# Patient Record
Sex: FEMALE | Race: WHITE | Employment: UNEMPLOYED | URBAN - NONMETROPOLITAN AREA
[De-identification: names, ages, dates, MRNs, and addresses within clinical notes are randomized per-mention and may not be internally consistent; named-entity substitution may affect disease eponyms.]

---

## 2017-02-02 ENCOUNTER — OFFICE VISIT (OUTPATIENT)
Dept: FAMILY MEDICINE | Facility: OTHER | Age: 37
End: 2017-02-02
Payer: COMMERCIAL

## 2017-02-02 VITALS
TEMPERATURE: 95.2 F | SYSTOLIC BLOOD PRESSURE: 112 MMHG | HEART RATE: 64 BPM | RESPIRATION RATE: 16 BRPM | DIASTOLIC BLOOD PRESSURE: 60 MMHG

## 2017-02-02 DIAGNOSIS — F41.9 ANXIETY: ICD-10-CM

## 2017-02-02 DIAGNOSIS — B00.9 HERPES SIMPLEX VIRUS INFECTION: Primary | ICD-10-CM

## 2017-02-02 DIAGNOSIS — L70.8 OTHER ACNE: ICD-10-CM

## 2017-02-02 PROCEDURE — 99213 OFFICE O/P EST LOW 20 MIN: CPT | Performed by: PHYSICIAN ASSISTANT

## 2017-02-02 RX ORDER — TRETINOIN 1 MG/G
CREAM TOPICAL AT BEDTIME
COMMUNITY
End: 2017-11-21

## 2017-02-02 RX ORDER — METRONIDAZOLE 500 MG/1
TABLET ORAL
COMMUNITY
End: 2018-11-06

## 2017-02-02 RX ORDER — TRETINOIN 1 MG/G
CREAM TOPICAL AT BEDTIME
Qty: 45 G | Refills: 1 | Status: SHIPPED | OUTPATIENT
Start: 2017-02-02 | End: 2018-08-22

## 2017-02-02 RX ORDER — VALACYCLOVIR HYDROCHLORIDE 1 G/1
1000 TABLET, FILM COATED ORAL DAILY
Qty: 90 TABLET | Refills: 0 | Status: SHIPPED | OUTPATIENT
Start: 2017-02-02 | End: 2017-09-19

## 2017-02-02 RX ORDER — VALACYCLOVIR HYDROCHLORIDE 1 G/1
1000 TABLET, FILM COATED ORAL 3 TIMES DAILY
COMMUNITY
End: 2017-11-21

## 2017-02-02 ASSESSMENT — PAIN SCALES - GENERAL: PAINLEVEL: NO PAIN (0)

## 2017-02-02 NOTE — PROGRESS NOTES
SUBJECTIVE:                                                    Anastacia Paez is a 36 year old female who presents to clinic today for the following health issues:      Medication Followup of valtrex, retin A, metronidazole     Taking Medication as prescribed: NO-she really self diagnoses and treats PRN for the concerns presented.      Side Effects:  None    Medication Helping Symptoms:  yes     Problem list and histories reviewed & adjusted, as indicated.  Additional history: as documented    Patient Active Problem List   Diagnosis     Seasonal allergic rhinitis     Encounter for initial prescription of intrauterine contraceptive device     Panic attack     Bilateral low back pain without sciatica     Dysmenorrhea     Anxiety     Herpes simplex virus infection     History reviewed. No pertinent past surgical history.    Social History   Substance Use Topics     Smoking status: Former Smoker     Quit date: 01/01/2004     Smokeless tobacco: Never Used     Alcohol Use: Yes      Comment: occasionally     History reviewed. No pertinent family history.        ROS:  Constitutional, HEENT, cardiovascular, pulmonary, gi and gu systems are negative, except as otherwise noted.    OBJECTIVE:                                                    /60 mmHg  Pulse 64  Temp(Src) 95.2  F (35.1  C) (Oral)  Resp 16  Ht   Wt   There is no weight on file to calculate BMI.  GENERAL: healthy, alert and no distress  NECK: no adenopathy, no asymmetry, masses, or scars and trachea midline and normal to palpation  RESP: lungs clear to auscultation - no rales, rhonchi or wheezes  CV: regular rate and rhythm, normal S1 S2, no S3 or S4, no murmur, click or rub, no peripheral edema and peripheral pulses strong  ABDOMEN: soft, nontender, no hepatosplenomegaly, no masses and bowel sounds normal  MS: no gross musculoskeletal defects noted, no edema  PSYCH: mentation appears normal, affect normal/bright    Diagnostic Test Results:  No results  found for this or any previous visit (from the past 24 hour(s)).     ASSESSMENT/PLAN:                                                    Anastacia was seen today for recheck medication.    Diagnoses and all orders for this visit:    Herpes simplex virus infection  Comments:  perioral  Orders:  -     valACYclovir (VALTREX) 1000 mg tablet; Take 1 tablet (1,000 mg) by mouth daily    Anxiety    Other acne  -     tretinoin (RETIN-A) 0.1 % cream; Apply topically At Bedtime    Advised she needs to get records prior to any further refills.  Regulo Dudley PA-C  [facility]

## 2017-02-02 NOTE — MR AVS SNAPSHOT
After Visit Summary   2/2/2017    Anastacia Paez    MRN: [id number]           Patient Information     Date Of Birth          1980        Visit Information        Provider Department      2/2/2017 8:40 AM Regulo Bearden PA-C [facility]        Today's Diagnoses     Herpes simplex virus infection    -  1     Anxiety         Other acne            Follow-ups after your visit        Who to contact     If you have questions or need follow up information about today's clinic visit or your schedule please contact [facility] directly at [phone].  Normal or non-critical lab and imaging results will be communicated to you by webmehart, letter or phone within 4 business days after the clinic has received the results. If you do not hear from us within 7 days, please contact the clinic through eEventt or phone. If you have a critical or abnormal lab result, we will notify you by phone as soon as possible.  Submit refill requests through [employer] or call your pharmacy and they will forward the refill request to us. Please allow 3 business days for your refill to be completed.          Additional Information About Your Visit        MyChart Information     [employer] gives you secure access to your electronic health record. If you see a primary care provider, you can also send messages to your care team and make appointments. If you have questions, please call your primary care clinic.  If you do not have a primary care provider, please call [phone] and they will assist you.        Care EveryWhere ID     This is your Care EveryWhere ID. This could be used by other organizations to access your [place] medical records  [id number]        Your Vitals Were     Pulse Temperature Respirations             64 95.2  F (35.1  C) (Oral) 16          Blood Pressure from Last 3 Encounters:   02/02/17 112/60   07/26/16 94/64   04/22/16 90/56    Weight from Last 3 Encounters:   07/26/16 156 lb (70.761  kg)   04/22/16 147 lb (66.679 kg)   11/17/15 148 lb (67.132 kg)              Today, you had the following     No orders found for display         Today's Medication Changes          These changes are accurate as of: 2/2/17  8:49 AM.  If you have any questions, ask your nurse or doctor.               These medicines have changed or have updated prescriptions.        Dose/Directions    * tretinoin 0.1 % cream   Commonly known as:  RETIN-A   This may have changed:  Another medication with the same name was added. Make sure you understand how and when to take each.   Changed by:  Regulo Bearden PA-C        Apply topically At Bedtime   Refills:  0       * tretinoin 0.1 % cream   Commonly known as:  RETIN-A   This may have changed:  You were already taking a medication with the same name, and this prescription was added. Make sure you understand how and when to take each.   Used for:  Other acne   Changed by:  Regulo Bearden PA-C        Apply topically At Bedtime   Quantity:  45 g   Refills:  1       * valACYclovir 1000 mg tablet   Commonly known as:  VALTREX   This may have changed:  Another medication with the same name was added. Make sure you understand how and when to take each.   Changed by:  Regulo Bearden PA-C        Dose:  1000 mg   Take 1,000 mg by mouth 3 times daily   Refills:  0       * valACYclovir 1000 mg tablet   Commonly known as:  VALTREX   This may have changed:  You were already taking a medication with the same name, and this prescription was added. Make sure you understand how and when to take each.   Used for:  Herpes simplex virus infection   Changed by:  Regulo Bearden PA-C        Dose:  1000 mg   Take 1 tablet (1,000 mg) by mouth daily   Quantity:  90 tablet   Refills:  0       * Notice:  This list has 4 medication(s) that are the same as other medications prescribed for you. Read the directions carefully, and ask your doctor or other care provider to review them with you.         Where to get  your medicines      These medications were sent to [facility] [place] - [place], [place] - [address]  [address] , [place] [address]     Phone:  [phone]    - tretinoin 0.1 % cream  - valACYclovir 1000 mg tablet             Primary Care Provider Office Phone # Fax #    Regulo Bearden PA-C [phone]       [facility] [address] [facility] [address]        Thank you!     Thank you for choosing [facility]  for your care. Our goal is always to provide you with excellent care. Hearing back from our patients is one way we can continue to improve our services. Please take a few minutes to complete the written survey that you may receive in the mail after your visit with us. Thank you!             Your Updated Medication List - Protect others around you: Learn how to safely use, store and throw away your medicines at www.disposemymeds.org.          This list is accurate as of: 2/2/17  8:49 AM.  Always use your most recent med list.                   Brand Name Dispense Instructions for use    ALPRAZolam 0.5 MG tablet    XANAX    90 tablet    Take 1 tablet (0.5 mg) by mouth 3 times daily as needed for anxiety       cetirizine 10 MG tablet    zyrTEC     Take 10 mg by mouth daily       citalopram 10 MG tablet    celeXA    90 tablet    Take 1 tablet (10 mg) by mouth daily       lidocaine 2 % topical gel    XYLOCAINE     Apply topically as needed for moderate pain Three time daily as needed       metroNIDAZOLE 500 MG tablet    FLAGYL     As needed for bacterial vaginal infections.       paragard intrauterine copper      1 each by Intrauterine route once Due to be changes in December 2018       * tretinoin 0.1 % cream    RETIN-A     Apply topically At Bedtime       * tretinoin 0.1 % cream    RETIN-A    45 g    Apply topically At Bedtime       * valACYclovir 1000 mg tablet    VALTREX     Take 1,000 mg by mouth 3 times daily       * valACYclovir 1000 mg tablet     VALTREX    90 tablet    Take 1 tablet (1,000 mg) by mouth daily       VOLTAREN 1 % Gel topical gel   Generic drug:  diclofenac      As needed for back pain       * Notice:  This list has 4 medication(s) that are the same as other medications prescribed for you. Read the directions carefully, and ask your doctor or other care provider to review them with you.

## 2017-04-05 DIAGNOSIS — F41.9 ANXIETY: ICD-10-CM

## 2017-04-05 NOTE — TELEPHONE ENCOUNTER
Alprazolam      Last Written Prescription Date: 10/26/16  Last Fill Quantity: 90,  # refills: 1   Last Office Visit with [facility], [facility] or [facility] prescribing provider: 2/2/17

## 2017-04-06 RX ORDER — ALPRAZOLAM 0.5 MG
0.5 TABLET ORAL 3 TIMES DAILY PRN
Qty: 90 TABLET | Refills: 1 | Status: SHIPPED | OUTPATIENT
Start: 2017-04-06 | End: 2017-09-19

## 2017-05-05 ENCOUNTER — TELEPHONE (OUTPATIENT)
Dept: OBGYN | Facility: OTHER | Age: 37
End: 2017-05-05

## 2017-05-05 NOTE — TELEPHONE ENCOUNTER
Reason for Call:  Other appointment    Detailed comments: pt scheduled for an appt June 2nd, but would like to be seen sooner if possible. Please contact pt if available to be seen sooner.     Phone Number Patient can be reached at: Home number on file [phone] (home)    Best Time: ANY    Can we leave a detailed message on this number? YES    Call taken on 5/5/2017 at 3:57 PM by Melisa Summers

## 2017-05-28 DIAGNOSIS — F41.9 ANXIETY: ICD-10-CM

## 2017-05-28 NOTE — TELEPHONE ENCOUNTER
Celexa 10mg      Last Written Prescription Date: 11/22/16  Last Fill Quantity: 90,  # refills: 1   Last Office Visit with [facility], [facility] or [facility] prescribing provider: 07/26/2016                                         Next 5 appointments (look out 90 days)     May 30, 2017 11:00 AM CDT   Office Visit with Elke Richardson DO   [facility] ([facility])    [address] [place] [address] [phone] [phone]                   Terri Carvajal, Pharmacy Technician  [facility]  [phone]

## 2017-05-30 RX ORDER — CITALOPRAM HYDROBROMIDE 10 MG/1
10 TABLET ORAL DAILY
Qty: 90 TABLET | Refills: 0 | Status: SHIPPED | OUTPATIENT
Start: 2017-05-30 | End: 2017-08-16

## 2017-08-16 DIAGNOSIS — F41.9 ANXIETY: ICD-10-CM

## 2017-08-16 RX ORDER — CITALOPRAM HYDROBROMIDE 10 MG/1
10 TABLET ORAL DAILY
Qty: 90 TABLET | Refills: 0 | Status: SHIPPED | OUTPATIENT
Start: 2017-08-16 | End: 2017-11-16

## 2017-08-16 NOTE — TELEPHONE ENCOUNTER
Celexa     Last Written Prescription Date: 05/30/2017  Last Fill Quantity: 90, # refills: 0  Last Office Visit with [facility] care provider:  02/02/2017        Last PHQ-9 score on record=   PHQ-9 SCORE 4/12/2016   Total Score 1           Thanks  Gissel Ewing [facility]   [phone]

## 2017-09-19 DIAGNOSIS — B00.9 HERPES SIMPLEX VIRUS INFECTION: ICD-10-CM

## 2017-09-19 DIAGNOSIS — F41.9 ANXIETY: ICD-10-CM

## 2017-09-19 RX ORDER — ALPRAZOLAM 0.5 MG
0.5 TABLET ORAL 3 TIMES DAILY PRN
Qty: 90 TABLET | Refills: 0 | Status: SHIPPED | OUTPATIENT
Start: 2017-09-19 | End: 2017-11-16

## 2017-09-19 RX ORDER — VALACYCLOVIR HYDROCHLORIDE 1 G/1
1000 TABLET, FILM COATED ORAL DAILY
Qty: 90 TABLET | Refills: 1 | Status: SHIPPED | OUTPATIENT
Start: 2017-09-19 | End: 2018-09-30

## 2017-09-19 NOTE — TELEPHONE ENCOUNTER
Valtrex      Last Written Prescription Date: 2/2/17  Last Fill Quantity: 90,  # refills: 0   Last Office Visit with [facility], [facility] or [facility] prescribing provider: 2/2/17                                             Alprazolam      Last Written Prescription Date: 4/6/17  Last Fill Quantity: 90,  # refills: 1   Last Office Visit with [facility], [facility] or [facility] prescribing provider: 2/2/17

## 2017-10-16 ENCOUNTER — OFFICE VISIT (OUTPATIENT)
Dept: FAMILY MEDICINE | Facility: CLINIC | Age: 37
End: 2017-10-16
Payer: COMMERCIAL

## 2017-10-16 VITALS
DIASTOLIC BLOOD PRESSURE: 58 MMHG | OXYGEN SATURATION: 99 % | BODY MASS INDEX: 25.39 KG/M2 | HEIGHT: 65 IN | TEMPERATURE: 98.7 F | HEART RATE: 60 BPM | SYSTOLIC BLOOD PRESSURE: 112 MMHG | WEIGHT: 152.4 LBS

## 2017-10-16 DIAGNOSIS — R21 RASH: Primary | ICD-10-CM

## 2017-10-16 PROCEDURE — 99213 OFFICE O/P EST LOW 20 MIN: CPT | Performed by: NURSE PRACTITIONER

## 2017-10-16 NOTE — PATIENT INSTRUCTIONS
Your provider has referred you to: [facility]: [facility] [phone]    Follow-up with your primary care provider next week and as needed.    Indications for emergent return to emergency department discussed with patient, who verbalized good understanding and agreement.  Patient understands the limitations of today's evaluation.         Self-Care for Skin Rashes     Pat your skin dry. Do not rub.     When your skin reacts to a substance your body is sensitive to, it can cause a rash. You can treat most rashes at home by keeping the skin clean and dry. Many rashes may get better on their own within 2 to 3 days. You may need medical attention if your rash itches, drains, or hurts, particularly if the rash is getting worse.  What can cause a skin rash?    Sun poisoning, caused by too much exposure to the sun    An irritant or allergic reaction to a certain type of food, plant, or chemical, such as  shellfish, poison ivy, and or cleaning products    An infection caused by a fungus (ringworm), virus (chickenpox), or bacteria (strep)    Bites or infestation caused by insects or pests, such as ticks, lice, or mites    Dry skin, which is often seen during the winter months and in older people  How can I control itching and skin damage?    Take soothing lukewarm baths in a colloidal oatmeal product. You can buy this at the [employer].    Do your best not to scratch. Clip fingernails short, especially in young children, to reduce skin damage if scratching does occur.    Use moisturizing skin lotion instead of scratching your dry skin.    Use sunscreen whenever going out into direct sun.    Use only mild cleansing agents whenever possible.    Wash with mild, nonirritating soap and warm water.    Wear clothing that breathes, such as cotton shirts or canvas shoes.    If fluid is seeping from the rash, cover it loosely with clean gauze to absorb the discharge.    Many rashes are contagious. Prevent the  rash from spreading to others by washing your hands often before or after touching others with any skin rash.  Use medicine    Antihistamines such as diphenhydramine can help control itching. But use with caution because they can make you drowsy.    Using over-the-counter hydrocortisone cream on small rashes may help reduce swelling and itching    Most over-the-counter antifungal medicines can treat athlete s foot and many other fungal infections of the skin.  Check with your healthcare provider  Call your healthcare provider if:    You were told that you have a fungal infection on your skin to make sure you have the correct type of medicine.    You have questions or concerns about medicines or their side effects.     Call 911  Call 911 if either of these occur:    Your tongue or lips start to swell    You have difficulty breathing      Call your healthcare provider  Call your healthcare provider if any of these occur:    Temperature of more than 101.0 F (38.3 C), or as directed    Sore throat, a cough, or unusual fatigue    Red, oozy, or painful rash gets worse. These are signs of infection.    Rash covers your face, genitals, or most of your body    Crusty sores or red rings that begin to spread    You were exposed to someone who has a contagious rash, such as scabies or lice.    Red bull s-eye rash with a white center (a sign of Lyme disease)    You were told that you have resistant bacteria (MRSA) on your skin.   Date Last Reviewed: 5/12/2015 2000-2017 [employer]. [address] [place] [address], [place] [address]. All rights reserved. This information is not intended as a substitute for professional medical care. Always follow your healthcare professional's instructions.

## 2017-10-16 NOTE — PROGRESS NOTES
SUBJECTIVE:   Anastacia Paez is a 37 year old female who presents to clinic today for the following health issues:      Derm problem       Duration: 6 months     Description (location/character/radiation): skin discoloration     Intensity:  moderate    Accompanying signs and symptoms: itching, tingles, burns, increased dark hair growth, no mole in the area, pinkish/brownish color     Requesting skin biopsy     History (similar episodes/previous evaluation): chronic sun lucas     Precipitating or alleviating factors: None    Therapies tried and outcome: wound wash- didn't help          Problem list and histories reviewed & adjusted, as indicated.  Additional history: as documented    Patient Active Problem List   Diagnosis     Seasonal allergic rhinitis     Encounter for initial prescription of intrauterine contraceptive device     Panic attack     Bilateral low back pain without sciatica     Dysmenorrhea     Anxiety     Herpes simplex virus infection     History reviewed. No pertinent surgical history.    Social History   Substance Use Topics     Smoking status: Former Smoker     Quit date: 1/1/2004     Smokeless tobacco: Never Used     Alcohol use Yes      Comment: occasionally     History reviewed. No pertinent family history.      Current Outpatient Prescriptions   Medication Sig Dispense Refill     ALPRAZolam (XANAX) 0.5 MG tablet Take 1 tablet (0.5 mg) by mouth 3 times daily as needed for anxiety 90 tablet 0     valACYclovir (VALTREX) 1000 mg tablet Take 1 tablet (1,000 mg) by mouth daily 90 tablet 1     citalopram (CELEXA) 10 MG tablet Take 1 tablet (10 mg) by mouth daily 90 tablet 0     tretinoin (RETIN-A) 0.1 % cream Apply topically At Bedtime       valACYclovir (VALTREX) 1000 mg tablet Take 1,000 mg by mouth 3 times daily       tretinoin (RETIN-A) 0.1 % cream Apply topically At Bedtime 45 g 1     diclofenac (VOLTAREN) 1 % GEL As needed for back pain       lidocaine (XYLOCAINE) 2 % jelly Apply topically as  "needed for moderate pain Three time daily as needed       paragard intrauterine copper 1 each by Intrauterine route once Due to be changes in December 2018       cetirizine (ZYRTEC) 10 MG tablet Take 10 mg by mouth daily       metroNIDAZOLE (FLAGYL) 500 MG tablet As needed for bacterial vaginal infections.       Allergies   Allergen Reactions     Morphine Itching         Reviewed and updated as needed this visit by clinical staffTobacco  Allergies  Meds  Med Hx  Surg Hx  Fam Hx  Soc Hx      Reviewed and updated as needed this visit by Provider         ROS:  Constitutional, HEENT, cardiovascular, pulmonary, gi and gu systems are negative, except as otherwise noted.      OBJECTIVE:   /58  Pulse 60  Temp 98.7  F (37.1  C) (Tympanic)  Ht 5' 5\" (1.651 m)  Wt 152 lb 6.4 oz (69.1 kg)  SpO2 99%  BMI 25.36 kg/m2  Body mass index is 25.36 kg/(m^2).   GENERAL: healthy, alert and no distress  EYES: Eyes grossly normal to inspection, PERRL and conjunctivae and sclerae normal  HENT: ear canals and TM's normal, nose and mouth without ulcers or lesions  NECK: no adenopathy, no asymmetry, masses, or scars and thyroid normal to palpation  RESP: lungs clear to auscultation - no rales, rhonchi or wheezes  BREAST: normal without masses, tenderness or nipple discharge and no palpable axillary masses or adenopathy  CV: regular rate and rhythm, normal S1 S2, no S3 or S4, no murmur, click or rub, no peripheral edema and peripheral pulses strong  ABDOMEN: soft, nontender, no hepatosplenomegaly, no masses and bowel sounds normal  MS: no gross musculoskeletal defects noted, no edema  SKIN: no suspicious lesions small area  slightly raised, red patches upper left leg.   NEURO: Normal strength and tone, mentation intact and speech normal  PSYCH: mentation appears normal, affect normal/bright        ASSESSMENT:       ICD-10-CM    1. Rash R21 DERMATOLOGY REFERRAL         PLAN:   Reviewed with patient that this is most " representative of eczema.  Patient instant that it needs to be biopsied and request a referral to dermatology.     Patient Instructions     Your provider has referred you to: [facility]: [facility] [phone]    Follow-up with your primary care provider next week and as needed.    Indications for emergent return to emergency department discussed with patient, who verbalized good understanding and agreement.  Patient understands the limitations of today's evaluation.         Self-Care for Skin Rashes     Pat your skin dry. Do not rub.     When your skin reacts to a substance your body is sensitive to, it can cause a rash. You can treat most rashes at home by keeping the skin clean and dry. Many rashes may get better on their own within 2 to 3 days. You may need medical attention if your rash itches, drains, or hurts, particularly if the rash is getting worse.  What can cause a skin rash?    Sun poisoning, caused by too much exposure to the sun    An irritant or allergic reaction to a certain type of food, plant, or chemical, such as  shellfish, poison ivy, and or cleaning products    An infection caused by a fungus (ringworm), virus (chickenpox), or bacteria (strep)    Bites or infestation caused by insects or pests, such as ticks, lice, or mites    Dry skin, which is often seen during the winter months and in older people  How can I control itching and skin damage?    Take soothing lukewarm baths in a colloidal oatmeal product. You can buy this at the [employer].    Do your best not to scratch. Clip fingernails short, especially in young children, to reduce skin damage if scratching does occur.    Use moisturizing skin lotion instead of scratching your dry skin.    Use sunscreen whenever going out into direct sun.    Use only mild cleansing agents whenever possible.    Wash with mild, nonirritating soap and warm water.    Wear clothing that breathes, such as cotton shirts or canvas  shoes.    If fluid is seeping from the rash, cover it loosely with clean gauze to absorb the discharge.    Many rashes are contagious. Prevent the rash from spreading to others by washing your hands often before or after touching others with any skin rash.  Use medicine    Antihistamines such as diphenhydramine can help control itching. But use with caution because they can make you drowsy.    Using over-the-counter hydrocortisone cream on small rashes may help reduce swelling and itching    Most over-the-counter antifungal medicines can treat athlete s foot and many other fungal infections of the skin.  Check with your healthcare provider  Call your healthcare provider if:    You were told that you have a fungal infection on your skin to make sure you have the correct type of medicine.    You have questions or concerns about medicines or their side effects.     Call 911  Call 911 if either of these occur:    Your tongue or lips start to swell    You have difficulty breathing      Call your healthcare provider  Call your healthcare provider if any of these occur:    Temperature of more than 101.0 F (38.3 C), or as directed    Sore throat, a cough, or unusual fatigue    Red, oozy, or painful rash gets worse. These are signs of infection.    Rash covers your face, genitals, or most of your body    Crusty sores or red rings that begin to spread    You were exposed to someone who has a contagious rash, such as scabies or lice.    Red bull s-eye rash with a white center (a sign of Lyme disease)    You were told that you have resistant bacteria (MRSA) on your skin.   Date Last Reviewed: 5/12/2015 2000-2017 [employer]. [address] [place] [address], [place] [address]. All rights reserved. This information is not intended as a substitute for professional medical care. Always follow your healthcare professional's instructions.              JUDI Dawson [facility]

## 2017-10-16 NOTE — MR AVS SNAPSHOT
After Visit Summary   10/16/2017    Anastacia Paez    MRN: [id number]           Patient Information     Date Of Birth          1980        Visit Information        Provider Department      10/16/2017 2:20 PM Beryl Coffey APRN CNP [facility]        Today's Diagnoses     Rash    -  1      Care Instructions    Your provider has referred you to: [facility]: [facility] [phone]    Follow-up with your primary care provider next week and as needed.    Indications for emergent return to emergency department discussed with patient, who verbalized good understanding and agreement.  Patient understands the limitations of today's evaluation.         Self-Care for Skin Rashes     Pat your skin dry. Do not rub.     When your skin reacts to a substance your body is sensitive to, it can cause a rash. You can treat most rashes at home by keeping the skin clean and dry. Many rashes may get better on their own within 2 to 3 days. You may need medical attention if your rash itches, drains, or hurts, particularly if the rash is getting worse.  What can cause a skin rash?    Sun poisoning, caused by too much exposure to the sun    An irritant or allergic reaction to a certain type of food, plant, or chemical, such as  shellfish, poison ivy, and or cleaning products    An infection caused by a fungus (ringworm), virus (chickenpox), or bacteria (strep)    Bites or infestation caused by insects or pests, such as ticks, lice, or mites    Dry skin, which is often seen during the winter months and in older people  How can I control itching and skin damage?    Take soothing lukewarm baths in a colloidal oatmeal product. You can buy this at the [employer].    Do your best not to scratch. Clip fingernails short, especially in young children, to reduce skin damage if scratching does occur.    Use moisturizing skin lotion instead of scratching your dry skin.    Use sunscreen  whenever going out into direct sun.    Use only mild cleansing agents whenever possible.    Wash with mild, nonirritating soap and warm water.    Wear clothing that breathes, such as cotton shirts or canvas shoes.    If fluid is seeping from the rash, cover it loosely with clean gauze to absorb the discharge.    Many rashes are contagious. Prevent the rash from spreading to others by washing your hands often before or after touching others with any skin rash.  Use medicine    Antihistamines such as diphenhydramine can help control itching. But use with caution because they can make you drowsy.    Using over-the-counter hydrocortisone cream on small rashes may help reduce swelling and itching    Most over-the-counter antifungal medicines can treat athlete s foot and many other fungal infections of the skin.  Check with your healthcare provider  Call your healthcare provider if:    You were told that you have a fungal infection on your skin to make sure you have the correct type of medicine.    You have questions or concerns about medicines or their side effects.     Call 911  Call 911 if either of these occur:    Your tongue or lips start to swell    You have difficulty breathing      Call your healthcare provider  Call your healthcare provider if any of these occur:    Temperature of more than 101.0 F (38.3 C), or as directed    Sore throat, a cough, or unusual fatigue    Red, oozy, or painful rash gets worse. These are signs of infection.    Rash covers your face, genitals, or most of your body    Crusty sores or red rings that begin to spread    You were exposed to someone who has a contagious rash, such as scabies or lice.    Red bull s-eye rash with a white center (a sign of Lyme disease)    You were told that you have resistant bacteria (MRSA) on your skin.   Date Last Reviewed: 5/12/2015 2000-2017 [employer]. [address] [place] [address], [place] [address]. All rights reserved. This information is  not intended as a substitute for professional medical care. Always follow your healthcare professional's instructions.                Follow-ups after your visit        Additional Services     DERMATOLOGY REFERRAL       Your provider has referred you to: VAIBHAV: [facility] [phone]   [email or web address]    Please be aware that coverage of these services is subject to the terms and limitations of your health insurance plan.  Call member services at your health plan with any benefit or coverage questions.      Please bring the following with you to your appointment:    (1) Any X-Rays, CTs or MRIs which have been performed.  Contact the facility where they were done to arrange for  prior to your scheduled appointment.    (2) List of current medications  (3) This referral request   (4) Any documents/labs given to you for this referral                  Who to contact     If you have questions or need follow up information about today's clinic visit or your schedule please contact [facility] directly at [phone].  Normal or non-critical lab and imaging results will be communicated to you by [employer], letter or phone within 4 business days after the clinic has received the results. If you do not hear from us within 7 days, please contact the clinic through [employer] or phone. If you have a critical or abnormal lab result, we will notify you by phone as soon as possible.  Submit refill requests through [employer] or call your pharmacy and they will forward the refill request to us. Please allow 3 business days for your refill to be completed.          Additional Information About Your Visit        [employer] Information     [employer] gives you secure access to your electronic health record. If you see a primary care provider, you can also send messages to your care team and make appointments. If you have questions, please call your primary care clinic.   "If you do not have a primary care provider, please call [phone] and they will assist you.        Care EveryWhere ID     This is your Care EveryWhere ID. This could be used by other organizations to access your [facility] medical records  [id number]        Your Vitals Were     Pulse Temperature Height Pulse Oximetry BMI (Body Mass Index)       60 98.7  F (37.1  C) (Tympanic) 5' 5\" (1.651 m) 99% 25.36 kg/m2        Blood Pressure from Last 3 Encounters:   10/16/17 112/58   02/02/17 112/60   07/26/16 94/64    Weight from Last 3 Encounters:   10/16/17 152 lb 6.4 oz (69.1 kg)   07/26/16 156 lb (70.8 kg)   04/22/16 147 lb (66.7 kg)              We Performed the Following     DERMATOLOGY REFERRAL        Primary Care Provider Office Phone # Fax #    Regulo Bearden PA-C [phone]       [address] [place] [address]        Equal Access to Services     [facility]: Hadii karen ku hadasho Soomaali, waaxda luqadaha, qaybta kaalmada adeegyada, ca boswell . So [facility] [phone].    ATENCIÓN: Si habla español, tiene a gilbert disposición servicios gratuitos de asistencia lingüística. Llame al [phone].    We comply with applicable federal civil rights laws and [place] laws. We do not discriminate on the basis of race, color, national origin, age, disability, sex, sexual orientation, or gender identity.            Thank you!     Thank you for choosing [facility]  for your care. Our goal is always to provide you with excellent care. Hearing back from our patients is one way we can continue to improve our services. Please take a few minutes to complete the written survey that you may receive in the mail after your visit with us. Thank you!             Your Updated Medication List - Protect others around you: Learn how to safely use, store and throw away your medicines at www.disposemymeds.org.          This list is accurate as of: 10/16/17  2:34 PM.  Always use " your most recent med list.                   Brand Name Dispense Instructions for use Diagnosis    ALPRAZolam 0.5 MG tablet    XANAX    90 tablet    Take 1 tablet (0.5 mg) by mouth 3 times daily as needed for anxiety    Anxiety       cetirizine 10 MG tablet    zyrTEC     Take 10 mg by mouth daily        citalopram 10 MG tablet    celeXA    90 tablet    Take 1 tablet (10 mg) by mouth daily    Anxiety       lidocaine 2 % topical gel    XYLOCAINE     Apply topically as needed for moderate pain Three time daily as needed        metroNIDAZOLE 500 MG tablet    FLAGYL     As needed for bacterial vaginal infections.        paragard intrauterine copper      1 each by Intrauterine route once Due to be changes in December 2018        * tretinoin 0.1 % cream    RETIN-A     Apply topically At Bedtime        * tretinoin 0.1 % cream    RETIN-A    45 g    Apply topically At Bedtime    Other acne       * valACYclovir 1000 mg tablet    VALTREX     Take 1,000 mg by mouth 3 times daily        * valACYclovir 1000 mg tablet    VALTREX    90 tablet    Take 1 tablet (1,000 mg) by mouth daily    Herpes simplex virus infection       VOLTAREN 1 % Gel topical gel   Generic drug:  diclofenac      As needed for back pain        * Notice:  This list has 4 medication(s) that are the same as other medications prescribed for you. Read the directions carefully, and ask your doctor or other care provider to review them with you.

## 2017-10-16 NOTE — NURSING NOTE
"Chief Complaint   Patient presents with     Skin Tags       Initial /58  Pulse 60  Temp 98.7  F (37.1  C) (Tympanic)  Ht 5' 5\" (1.651 m)  Wt 152 lb 6.4 oz (69.1 kg)  SpO2 99%  BMI 25.36 kg/m2 Estimated body mass index is 25.36 kg/(m^2) as calculated from the following:    Height as of this encounter: 5' 5\" (1.651 m).    Weight as of this encounter: 152 lb 6.4 oz (69.1 kg).  Medication Reconciliation: complete    Health Maintenance that is potentially due pending provider review:  NONE    n/a    Is there anyone who you would like to be able to receive your results? No  If yes have patient fill out EBONY      "

## 2017-10-19 ENCOUNTER — OFFICE VISIT (OUTPATIENT)
Dept: DERMATOLOGY | Facility: CLINIC | Age: 37
End: 2017-10-19
Payer: COMMERCIAL

## 2017-10-19 VITALS
HEART RATE: 57 BPM | SYSTOLIC BLOOD PRESSURE: 109 MMHG | DIASTOLIC BLOOD PRESSURE: 60 MMHG | HEIGHT: 65 IN | WEIGHT: 147 LBS | BODY MASS INDEX: 24.49 KG/M2

## 2017-10-19 DIAGNOSIS — L81.4 LENTIGO: ICD-10-CM

## 2017-10-19 DIAGNOSIS — L28.0 LICHEN SIMPLEX CHRONICUS: ICD-10-CM

## 2017-10-19 DIAGNOSIS — D22.9 NEVUS: ICD-10-CM

## 2017-10-19 DIAGNOSIS — L82.1 SEBORRHEIC KERATOSIS: ICD-10-CM

## 2017-10-19 DIAGNOSIS — Z41.1 ELECTIVE PROCEDURE FOR UNACCEPTABLE COSMETIC APPEARANCE: ICD-10-CM

## 2017-10-19 DIAGNOSIS — D18.00 ANGIOMA: ICD-10-CM

## 2017-10-19 DIAGNOSIS — L57.8 DIFFUSE PHOTODAMAGE OF SKIN: ICD-10-CM

## 2017-10-19 DIAGNOSIS — D48.5 NEOPLASM OF UNCERTAIN BEHAVIOR OF SKIN: Primary | ICD-10-CM

## 2017-10-19 PROCEDURE — 96999 UNLISTED SPEC DERM SVC/PX: CPT | Performed by: PHYSICIAN ASSISTANT

## 2017-10-19 PROCEDURE — 11100 HC BIOPSY SKIN/SUBQ/MUC MEM, SINGLE LESION: CPT | Performed by: PHYSICIAN ASSISTANT

## 2017-10-19 PROCEDURE — 88305 TISSUE EXAM BY PATHOLOGIST: CPT | Performed by: PHYSICIAN ASSISTANT

## 2017-10-19 PROCEDURE — 99204 OFFICE O/P NEW MOD 45 MIN: CPT | Mod: 25 | Performed by: PHYSICIAN ASSISTANT

## 2017-10-19 RX ORDER — BETAMETHASONE DIPROPIONATE 0.5 MG/G
CREAM TOPICAL
Qty: 50 G | Refills: 1 | Status: SHIPPED | OUTPATIENT
Start: 2017-10-19

## 2017-10-19 NOTE — MR AVS SNAPSHOT
After Visit Summary   10/19/2017    Anastacia Paez    MRN: [id number]           Patient Information     Date Of Birth          1980        Visit Information        Provider Department      10/19/2017 11:30 AM aKtelin Okeefe PA-C [facility]        Today's Diagnoses     Neoplasm of uncertain behavior of skin    -  1      Care Instructions          Wound Care Instructions     FOR SUPERFICIAL WOUNDS     [facility] [phone]    [facility] [phone]      Left thigh                 AFTER 24 HOURS YOU SHOULD REMOVE THE BANDAGE AND BEGIN DAILY DRESSING CHANGES AS FOLLOWS:     1) Remove Dressing.     2) Clean and dry the area with tap water using a Q-tip or sterile gauze pad.     3) Apply Vaseline, Aquaphor, Polysporin ointment or Bacitracin ointment over entire wound.  Do NOT use Neosporin ointment.     4) Cover the wound with a band-aid, or a sterile non-stick gauze pad and micropore paper tape      REPEAT THESE INSTRUCTIONS AT LEAST ONCE A DAY UNTIL THE WOUND HAS COMPLETELY HEALED.    It is an old wives tale that a wound heals better when it is exposed to air and allowed to dry out. The wound will heal faster with a better cosmetic result if it is kept moist with ointment and covered with a bandage.    **Do not let the wound dry out.**      Supplies Needed:      *Cotton tipped applicators (Q-tips)    *Polysporin Ointment or Bacitracin Ointment (NOT NEOSPORIN)    *Band-aids or non-stick gauze pads and micropore paper tape.      PATIENT INFORMATION:    During the healing process you will notice a number of changes. All wounds develop a small halo of redness surrounding the wound.  This means healing is occurring. Severe itching with extensive redness usually indicates sensitivity to the ointment or bandage tape used to dress the wound.  You should call our office if this develops.      Swelling  and/or discoloration around your surgical site is common,  particularly when performed around the eye.    All wounds normally drain.  The larger the wound the more drainage there will be.  After 7-10 days, you will notice the wound beginning to shrink and new skin will begin to grow.  The wound is healed when you can see skin has formed over the entire area.  A healed wound has a healthy, shiny look to the surface and is red to dark pink in color to normalize.  Wounds may take approximately 4-6 weeks to heal.  Larger wounds may take 6-8 weeks.  After the wound is healed you may discontinue dressing changes.    You may experience a sensation of tightness as your wound heals. This is normal and will gradually subside.    Your healed wound may be sensitive to temperature changes. This sensitivity improves with time, but if you re having a lot of discomfort, try to avoid temperature extremes.    Patients frequently experience itching after their wound appears to have healed because of the continue healing under the skin.  Plain Vaseline will help relieve the itching.        POSSIBLE COMPLICATIONS    BLEEDIN. Leave the bandage in place.  2. Use tightly rolled up gauze or a cloth to apply direct pressure over the bandage for 30  minutes.  3. Reapply pressure for an additional 30 minutes if necessary  4. Use additional gauze and tape to maintain pressure once the bleeding has stopped.              Follow-ups after your visit        Who to contact     If you have questions or need follow up information about today's clinic visit or your schedule please contact [facility] directly at [phone].  Normal or non-critical lab and imaging results will be communicated to you by MyChart, letter or phone within 4 business days after the clinic has received the results. If you do not hear from us within 7 days, please contact the clinic through MyChart or phone. If you have a critical or abnormal lab result, we will notify you by phone as soon as possible.  Submit  "refill requests through [employer] or call your pharmacy and they will forward the refill request to us. Please allow 3 business days for your refill to be completed.          Additional Information About Your Visit        [employer] Information     [employer] gives you secure access to your electronic health record. If you see a primary care provider, you can also send messages to your care team and make appointments. If you have questions, please call your primary care clinic.  If you do not have a primary care provider, please call [phone] and they will assist you.        Care EveryWhere ID     This is your Care EveryWhere ID. This could be used by other organizations to access your [facility] medical records  [id number]        Your Vitals Were     Pulse Height BMI (Body Mass Index)             57 1.651 m (5' 5\") 24.46 kg/m2          Blood Pressure from Last 3 Encounters:   10/19/17 109/60   10/16/17 112/58   02/02/17 112/60    Weight from Last 3 Encounters:   10/19/17 66.7 kg (147 lb)   10/16/17 69.1 kg (152 lb 6.4 oz)   07/26/16 70.8 kg (156 lb)              We Performed the Following     BIOPSY SKIN/SUBQ/MUC MEM, SINGLE LESION     Surgical pathology exam        Primary Care Provider Office Phone # Fax #    Regulo Bearden PA-C [phone]       [address] [facility] [address]        Equal Access to Services     [facility] : Hadii karen ku hadasho [place], waaxda luqadaha, qaybta kaalmada tatiannayayusuf, ca boswell . So [facility] [phone].    ATENCIÓN: Si habla español, tiene a gilbert disposición servicios gratuitos de asistencia lingüística. Leanna al [phone].    We comply with applicable federal civil rights laws and [place] laws. We do not discriminate on the basis of race, color, national origin, age, disability, sex, sexual orientation, or gender identity.            Thank you!     Thank you for choosing [facility]  for your care. Our goal is always to " provide you with excellent care. Hearing back from our patients is one way we can continue to improve our services. Please take a few minutes to complete the written survey that you may receive in the mail after your visit with us. Thank you!             Your Updated Medication List - Protect others around you: Learn how to safely use, store and throw away your medicines at www.disposemymeds.org.          This list is accurate as of: 10/19/17 12:14 PM.  Always use your most recent med list.                   Brand Name Dispense Instructions for use Diagnosis    ALPRAZolam 0.5 MG tablet    XANAX    90 tablet    Take 1 tablet (0.5 mg) by mouth 3 times daily as needed for anxiety    Anxiety       cetirizine 10 MG tablet    zyrTEC     Take 10 mg by mouth daily        citalopram 10 MG tablet    celeXA    90 tablet    Take 1 tablet (10 mg) by mouth daily    Anxiety       lidocaine 2 % topical gel    XYLOCAINE     Apply topically as needed for moderate pain Three time daily as needed        metroNIDAZOLE 500 MG tablet    FLAGYL     As needed for bacterial vaginal infections.        paragard intrauterine copper      1 each by Intrauterine route once Due to be changes in December 2018        * tretinoin 0.1 % cream    RETIN-A     Apply topically At Bedtime        * tretinoin 0.1 % cream    RETIN-A    45 g    Apply topically At Bedtime    Other acne       * valACYclovir 1000 mg tablet    VALTREX     Take 1,000 mg by mouth 3 times daily        * valACYclovir 1000 mg tablet    VALTREX    90 tablet    Take 1 tablet (1,000 mg) by mouth daily    Herpes simplex virus infection       VOLTAREN 1 % Gel topical gel   Generic drug:  diclofenac      As needed for back pain        * Notice:  This list has 4 medication(s) that are the same as other medications prescribed for you. Read the directions carefully, and ask your doctor or other care provider to review them with you.

## 2017-10-19 NOTE — PROGRESS NOTES
HPI:   Anastacia Paez is a 37 year old female who presents for Full skin cancer screening.  chief complaint  Last Skin Exam: n/a      1st Baseline: yes  Personal HX of Skin Cancer: no   Personal HX of Malignant Melanoma: no   Family HX of Skin Cancer / Malignant Melanoma: no  Personal HX of Atypical Moles:   no  Risk factors: frequent tanning bed use - currently uses tanning beds  New / Changing lesions:yes persistent rash/itchy spot/lesion on left thigh  Social History: Works as a nurse for [employer]. Has 3 kids; live sin [facility].   On review of systems, there are no further skin complaints, patient is feeling otherwise well.  See patient intake sheet.  ROS of the following were done and are negative: Constitutional, Eyes, Ears, Nose,   Mouth, Throat, Cardiovascular, Respiratory, GI, Genitourinary, Musculoskeletal,   Psychiatric, Endocrine, Allergic/Immunologic.    PHYSICAL EXAM:   Weight:  BP:   Skin exam performed as follows: Type 3 skin. Mood appropriate  Alert and Oriented X 3. Well developed, well nourished in no distress.  General appearance: Normal  Head including face: Normal  Eyes: conjunctiva and lids: Normal  Mouth: Lips, teeth, gums: Normal  Neck: Normal  Chest-breast/axillae: Normal  Back: Normal  Spleen and liver: Normal  Cardiovascular: Exam of peripheral vascular system by observation for swelling, varicosities, edema: Normal  Genitalia: groin, buttocks: Normal  Extremities: digits/nails (clubbing): Normal  Eccrine and Apocrine glands: Normal  Right upper extremity: Normal  Left upper extremity: Normal  Right lower extremity: Normal  Left lower extremity: Normal  Skin: Scalp and body hair: See below    Pt deferred exam of breasts, groin, buttocks: No    Other physical findings:  1. Multiple pigmented macules on extremities and trunk  2. Multiple pigmented macules on face, trunk and extremities  3. Multiple vascular papules on trunk, arms and legs  4. Multiple scattered keratotic plaques  5.  Lichenified plaque on left medial thigh       Except as noted above, no other signs of skin cancer or melanoma.     ASSESSMENT/PLAN:   Benign Full skin cancer screening today. . Patient with history of none  Advised on monthly self exams and 1 year  Patient Education: Appropriate brochures given.    Multiple benign appearing nevi on arms, legs and trunk. Discussed ABCDEs of melanoma and sunscreen.   Multiple lentigos on arms, legs and trunk. Advised benign, no treatment needed.  Multiple scattered angiomas. Advised benign, no treatment needed.   Seborrheic keratosis on arms, legs and trunk. Advised benign, no treatment needed.  LSC r/o atypicality on left medial thigh. Shave bx in typical fashion .  Area cleaned with betadyne and anesthetized with 1% lidocaine with epi .  Dermablade used to remove the lesion and sent to pathology. Bleeding was cauterized. Pt tolerated procedure well. --Start betamethasone cream BID x 2-3 weeks then PRN only. Advised to be as hands off as possible and skin texture will return to normal.   Diffuse photodamage of skin - advised. Discussed sun protection at length. Advised to d/c tanning beds immediately.   Rhytidosis facialis - most bothered by crow's feet. Discussed cosmetic botox.  Treatment, duration of 3 months, cosmetic cost of $12 per unit discussed at length with patient. She would like this done today.   --26 units to glabella  --4 units to tail of frontalis for eyebrow lift  --12 to each crow's  --Total of 52 units for a cosmetic charge of $624  --Start Skin [employer] AHA/BHA cleanser daily; continue tretinoin and vitamin C serum daily.       Follow-up: yearly FSE/PRN sooner    1.) Patient was asked about new and changing moles. YES  2.) Patient received a complete physical skin examination: YES  3.) Patient was counseled to perform a monthly self skin examination: YES  Scribed By: Katelin Okeefe, MS, PA-C

## 2017-10-19 NOTE — LETTER
10/19/2017         RE: Anastacia Paez  [address] [facility] [address]        Dear Colleague,    Thank you for referring your patient, Anastacia Paez, to the [facility]. Please see a copy of my visit note below.    HPI:   Anastacia Paez is a 37 year old female who presents for Full skin cancer screening.  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 Has 3 kids; live sin [facility].   On review of systems, there are no further skin complaints, patient is feeling otherwise well.  See patient intake sheet.  ROS of the following were done and are negative: Constitutional, Eyes, Ears, Nose,   Mouth, Throat, Cardiovascular, Respiratory, GI, Genitourinary, Musculoskeletal,   Psychiatric, Endocrine, Allergic/Immunologic.    PHYSICAL EXAM:   Weight:  BP:   Skin exam performed as follows: Type 3 skin. Mood appropriate  Alert and Oriented X 3. Well developed, well nourished in no distress.  General appearance: Normal  Head including face: Normal  Eyes: conjunctiva and lids: Normal  Mouth: Lips, teeth, gums: Normal  Neck: Normal  Chest-breast/axillae: Normal  Back: Normal  Spleen and liver: Normal  Cardiovascular: Exam of peripheral vascular system by observation for swelling, varicosities, edema: Normal  Genitalia: groin, buttocks: Normal  Extremities: digits/nails (clubbing): Normal  Eccrine and Apocrine glands: Normal  Right upper extremity: Normal  Left upper extremity: Normal  Right lower extremity: Normal  Left lower extremity: Normal  Skin: Scalp and body hair: See below    Pt deferred exam of breasts, groin, buttocks: No    Other physical  findings:  1. Multiple pigmented macules on extremities and trunk  2. Multiple pigmented macules on face, trunk and extremities  3. Multiple vascular papules on trunk, arms and legs  4. Multiple scattered keratotic plaques  5. Lichenified plaque on left medial thigh       Except as noted above, no other signs of skin cancer or melanoma.     ASSESSMENT/PLAN:   Benign Full skin cancer screening today. . Patient with history of none  Advised on monthly self exams and 1 year  Patient Education: Appropriate brochures given.    Multiple benign appearing nevi on arms, legs and trunk. Discussed ABCDEs of melanoma and sunscreen.   Multiple lentigos on arms, legs and trunk. Advised benign, no treatment needed.  Multiple scattered angiomas. Advised benign, no treatment needed.   Seborrheic keratosis on arms, legs and trunk. Advised benign, no treatment needed.  LSC r/o atypicality on left medial thigh. Shave bx in typical fashion .  Area cleaned with betadyne and anesthetized with 1% lidocaine with epi .  Dermablade used to remove the lesion and sent to pathology. Bleeding was cauterized. Pt tolerated procedure well. --Start betamethasone cream BID x 2-3 weeks then PRN only. Advised to be as hands off as possible and skin texture will return to normal.   Diffuse photodamage of skin - advised. Discussed sun protection at length. Advised to d/c tanning beds immediately.   Rhytidosis facialis - most bothered by crow's feet. Discussed cosmetic botox.  Treatment, duration of 3 months, cosmetic cost of $12 per unit discussed at length with patient. She would like this done today.   --26 units to glabella  --4 units to tail of frontalis for eyebrow lift  --12 to each crow's  --Total of 52 units for a cosmetic charge of $624  --Start Skin Medica AHA/BHA cleanser daily; continue tretinoin and vitamin C serum daily.       Follow-up: yearly FSE/PRN sooner    1.) Patient was asked about new and changing moles. YES  2.) Patient received a  complete physical skin examination: YES  3.) Patient was counseled to perform a monthly self skin examination: YES  Scribed By: Katelin Okeefe, MS, PAChelseaC      Again, thank you for allowing me to participate in the care of your patient.        Sincerely,        Katelin Okeefe PA-C

## 2017-10-19 NOTE — PATIENT INSTRUCTIONS
Wound Care Instructions     FOR SUPERFICIAL WOUNDS     [facility] [phone]    [facility] [phone]      Left thigh                 AFTER 24 HOURS YOU SHOULD REMOVE THE BANDAGE AND BEGIN DAILY DRESSING CHANGES AS FOLLOWS:     1) Remove Dressing.     2) Clean and dry the area with tap water using a Q-tip or sterile gauze pad.     3) Apply Vaseline, Aquaphor, Polysporin ointment or Bacitracin ointment over entire wound.  Do NOT use Neosporin ointment.     4) Cover the wound with a band-aid, or a sterile non-stick gauze pad and micropore paper tape      REPEAT THESE INSTRUCTIONS AT LEAST ONCE A DAY UNTIL THE WOUND HAS COMPLETELY HEALED.    It is an old wives tale that a wound heals better when it is exposed to air and allowed to dry out. The wound will heal faster with a better cosmetic result if it is kept moist with ointment and covered with a bandage.    **Do not let the wound dry out.**      Supplies Needed:      *Cotton tipped applicators (Q-tips)    *Polysporin Ointment or Bacitracin Ointment (NOT NEOSPORIN)    *Band-aids or non-stick gauze pads and micropore paper tape.      PATIENT INFORMATION:    During the healing process you will notice a number of changes. All wounds develop a small halo of redness surrounding the wound.  This means healing is occurring. Severe itching with extensive redness usually indicates sensitivity to the ointment or bandage tape used to dress the wound.  You should call our office if this develops.      Swelling  and/or discoloration around your surgical site is common, particularly when performed around the eye.    All wounds normally drain.  The larger the wound the more drainage there will be.  After 7-10 days, you will notice the wound beginning to shrink and new skin will begin to grow.  The wound is healed when you can see skin has formed over the entire area.  A healed wound has a healthy, shiny look to the surface and is red to dark pink  in color to normalize.  Wounds may take approximately 4-6 weeks to heal.  Larger wounds may take 6-8 weeks.  After the wound is healed you may discontinue dressing changes.    You may experience a sensation of tightness as your wound heals. This is normal and will gradually subside.    Your healed wound may be sensitive to temperature changes. This sensitivity improves with time, but if you re having a lot of discomfort, try to avoid temperature extremes.    Patients frequently experience itching after their wound appears to have healed because of the continue healing under the skin.  Plain Vaseline will help relieve the itching.        POSSIBLE COMPLICATIONS    BLEEDIN. Leave the bandage in place.  2. Use tightly rolled up gauze or a cloth to apply direct pressure over the bandage for 30  minutes.  3. Reapply pressure for an additional 30 minutes if necessary  4. Use additional gauze and tape to maintain pressure once the bleeding has stopped.

## 2017-10-19 NOTE — NURSING NOTE
"Chief Complaint   Patient presents with     Derm Problem     Rash       Initial /60 (BP Location: Left arm)  Pulse 57  Ht 1.651 m (5' 5\")  Wt 66.7 kg (147 lb)  BMI 24.46 kg/m2 Estimated body mass index is 24.46 kg/(m^2) as calculated from the following:    Height as of this encounter: 1.651 m (5' 5\").    Weight as of this encounter: 66.7 kg (147 lb).  Medication Reconciliation: complete    "

## 2017-10-24 LAB — COPATH REPORT: NORMAL

## 2017-11-16 DIAGNOSIS — F41.9 ANXIETY: ICD-10-CM

## 2017-11-20 RX ORDER — ALPRAZOLAM 0.5 MG
0.5 TABLET ORAL 3 TIMES DAILY PRN
Qty: 30 TABLET | Refills: 0 | Status: SHIPPED | OUTPATIENT
Start: 2017-11-20 | End: 2017-11-21

## 2017-11-20 RX ORDER — CITALOPRAM HYDROBROMIDE 10 MG/1
10 TABLET ORAL DAILY
Qty: 90 TABLET | Refills: 0 | Status: SHIPPED | OUTPATIENT
Start: 2017-11-20 | End: 2017-11-21

## 2017-11-20 NOTE — TELEPHONE ENCOUNTER
Requested Prescriptions   Pending Prescriptions Disp Refills     ALPRAZolam (XANAX) 0.5 MG tablet 90 tablet 0     Sig: Take 1 tablet (0.5 mg) by mouth 3 times daily as needed for anxiety    There is no refill protocol information for this order        citalopram (CELEXA) 10 MG tablet 90 tablet 0     Sig: Take 1 tablet (10 mg) by mouth daily    SSRIs Protocol Passed    11/20/2017  8:13 AM       Passed - Medication is NOT Bupropion    If the medication is Bupropion (Wellbutrin), and the patient is taking for smoking cessation; OK to refill.         Passed - Patient is age 18 or older       Passed - No active pregnancy on record       Passed - No positive pregnancy test in last 12 months        Last ov 10/16/2017  Prescription approved per [facility] Refill Protocol.  Due for mood follow up before she is out of refills.    Please call and help schedule.  Flag remaining refill for provider.    Hugh Tai, RN, BSN

## 2017-11-21 ENCOUNTER — OFFICE VISIT (OUTPATIENT)
Dept: FAMILY MEDICINE | Facility: CLINIC | Age: 37
End: 2017-11-21
Payer: COMMERCIAL

## 2017-11-21 VITALS
HEART RATE: 80 BPM | BODY MASS INDEX: 22.99 KG/M2 | SYSTOLIC BLOOD PRESSURE: 106 MMHG | WEIGHT: 138 LBS | RESPIRATION RATE: 12 BRPM | DIASTOLIC BLOOD PRESSURE: 50 MMHG | TEMPERATURE: 98.1 F | HEIGHT: 65 IN

## 2017-11-21 DIAGNOSIS — F41.9 ANXIETY: ICD-10-CM

## 2017-11-21 PROCEDURE — 99213 OFFICE O/P EST LOW 20 MIN: CPT | Performed by: PHYSICIAN ASSISTANT

## 2017-11-21 RX ORDER — CITALOPRAM HYDROBROMIDE 10 MG/1
10 TABLET ORAL DAILY
Qty: 90 TABLET | Refills: 3 | Status: SHIPPED | OUTPATIENT
Start: 2017-11-21 | End: 2018-07-30

## 2017-11-21 RX ORDER — ALPRAZOLAM 0.5 MG
0.5 TABLET ORAL 3 TIMES DAILY PRN
Qty: 90 TABLET | Refills: 3 | Status: SHIPPED | OUTPATIENT
Start: 2017-11-21 | End: 2018-03-09

## 2017-11-21 ASSESSMENT — ENCOUNTER SYMPTOMS
DIAPHORESIS: 0
TINGLING: 0
BACK PAIN: 0
SHORTNESS OF BREATH: 0
DIZZINESS: 0
ABDOMINAL PAIN: 0
NECK PAIN: 0
NEUROLOGICAL NEGATIVE: 1
DEPRESSION: 1
DOUBLE VISION: 0
BLURRED VISION: 0
SEIZURES: 0
HEADACHES: 0
CONSTIPATION: 0
HEARTBURN: 0
LOSS OF CONSCIOUSNESS: 0
BLOOD IN STOOL: 0
ORTHOPNEA: 0
PALPITATIONS: 0
FEVER: 0
WHEEZING: 0
EYE REDNESS: 0
FOCAL WEAKNESS: 0
DYSURIA: 0
COUGH: 0
SENSORY CHANGE: 0
EYE PAIN: 0
EYE DISCHARGE: 0
MYALGIAS: 0
PHOTOPHOBIA: 0
FREQUENCY: 0
WEAKNESS: 0
DIARRHEA: 0
NAUSEA: 0
HEMOPTYSIS: 0
SPUTUM PRODUCTION: 0
SORE THROAT: 0
WEIGHT LOSS: 0
VOMITING: 0
INSOMNIA: 0
HALLUCINATIONS: 0

## 2017-11-21 ASSESSMENT — ANXIETY QUESTIONNAIRES
2. NOT BEING ABLE TO STOP OR CONTROL WORRYING: NOT AT ALL
3. WORRYING TOO MUCH ABOUT DIFFERENT THINGS: SEVERAL DAYS
6. BECOMING EASILY ANNOYED OR IRRITABLE: NOT AT ALL
5. BEING SO RESTLESS THAT IT IS HARD TO SIT STILL: NOT AT ALL
1. FEELING NERVOUS, ANXIOUS, OR ON EDGE: SEVERAL DAYS
GAD7 TOTAL SCORE: 2
7. FEELING AFRAID AS IF SOMETHING AWFUL MIGHT HAPPEN: NOT AT ALL

## 2017-11-21 ASSESSMENT — LIFESTYLE VARIABLES: SUBSTANCE_ABUSE: 0

## 2017-11-21 ASSESSMENT — PATIENT HEALTH QUESTIONNAIRE - PHQ9
SUM OF ALL RESPONSES TO PHQ QUESTIONS 1-9: 1
5. POOR APPETITE OR OVEREATING: NOT AT ALL

## 2017-11-21 NOTE — MR AVS SNAPSHOT
"              After Visit Summary   11/21/2017    Anastacia Paez    MRN: [id number]           Patient Information     Date Of Birth          1980        Visit Information        Provider Department      11/21/2017 8:40 AM Regino Luciano PA-C [facility]        Today's Diagnoses     Anxiety           Follow-ups after your visit        Follow-up notes from your care team     Return if symptoms worsen or fail to improve.      Who to contact     If you have questions or need follow up information about today's clinic visit or your schedule please contact [facility] directly at [phone].  Normal or non-critical lab and imaging results will be communicated to you by [employer], letter or phone within 4 business days after the clinic has received the results. If you do not hear from us within 7 days, please contact the clinic through [employer] or phone. If you have a critical or abnormal lab result, we will notify you by phone as soon as possible.  Submit refill requests through [employer] or call your pharmacy and they will forward the refill request to us. Please allow 3 business days for your refill to be completed.          Additional Information About Your Visit        MyChart Information     [employer] gives you secure access to your electronic health record. If you see a primary care provider, you can also send messages to your care team and make appointments. If you have questions, please call your primary care clinic.  If you do not have a primary care provider, please call [phone] and they will assist you.        Care EveryWhere ID     This is your Care EveryWhere ID. This could be used by other organizations to access your [place] medical records  [id number]        Your Vitals Were     Pulse Temperature Respirations Height BMI (Body Mass Index)       80 98.1  F (36.7  C) (Tympanic) 12 5' 5\" (1.651 m) 22.96 kg/m2        Blood Pressure from Last 3 Encounters:   11/21/17 " 106/50   10/19/17 109/60   10/16/17 112/58    Weight from Last 3 Encounters:   11/21/17 138 lb (62.6 kg)   10/19/17 147 lb (66.7 kg)   10/16/17 152 lb 6.4 oz (69.1 kg)              Today, you had the following     No orders found for display         Today's Medication Changes          These changes are accurate as of: 11/21/17  9:09 AM.  If you have any questions, ask your nurse or doctor.               These medicines have changed or have updated prescriptions.        Dose/Directions    tretinoin 0.1 % cream   Commonly known as:  RETIN-A   This may have changed:  Another medication with the same name was removed. Continue taking this medication, and follow the directions you see here.   Used for:  Other acne   Changed by:  Regulo Bearden PA-C        Apply topically At Bedtime   Quantity:  45 g   Refills:  1       valACYclovir 1000 mg tablet   Commonly known as:  VALTREX   This may have changed:  Another medication with the same name was removed. Continue taking this medication, and follow the directions you see here.   Used for:  Herpes simplex virus infection   Changed by:  Regulo Bearden PA-C        Dose:  1000 mg   Take 1 tablet (1,000 mg) by mouth daily   Quantity:  90 tablet   Refills:  1            Where to get your medicines      These medications were sent to [facility] [place] [address]     Phone:  [phone]     citalopram 10 MG tablet         Some of these will need a paper prescription and others can be bought over the counter.  Ask your nurse if you have questions.     Bring a paper prescription for each of these medications     ALPRAZolam 0.5 MG tablet                Primary Care Provider Office Phone # Fax #    Regino Luciano PA-C [phone]       [address] [place], [place] [address]        Equal Access to Services     FIOR LAUGHLIN AH: Bry Kidd, wislon ha, randy paulson  ca williambraulio angelaan ah. So [facility] [phone].    ATENCIÓN: Si nevillela erick, tiene a gilbert disposición servicios gratuitos de asistencia lingüística. Leanna al [phone].    We comply with applicable federal civil rights laws and [place] laws. We do not discriminate on the basis of race, color, national origin, age, disability, sex, sexual orientation, or gender identity.            Thank you!     Thank you for choosing [facility]  for your care. Our goal is always to provide you with excellent care. Hearing back from our patients is one way we can continue to improve our services. Please take a few minutes to complete the written survey that you may receive in the mail after your visit with us. Thank you!             Your Updated Medication List - Protect others around you: Learn how to safely use, store and throw away your medicines at www.disposemymeds.org.          This list is accurate as of: 11/21/17  9:09 AM.  Always use your most recent med list.                   Brand Name Dispense Instructions for use Diagnosis    ALPRAZolam 0.5 MG tablet    XANAX    90 tablet    Take 1 tablet (0.5 mg) by mouth 3 times daily as needed for anxiety    Anxiety       augmented betamethasone dipropionate 0.05 % cream    DIPROLENE-AF    50 g    Apply to AA BID x 2-3 weeks then PRN    Lichen simplex chronicus       cetirizine 10 MG tablet    zyrTEC     Take 10 mg by mouth daily        citalopram 10 MG tablet    celeXA    90 tablet    Take 1 tablet (10 mg) by mouth daily    Anxiety       lidocaine 2 % topical gel    XYLOCAINE     Apply topically as needed for moderate pain Three time daily as needed        metroNIDAZOLE 500 MG tablet    FLAGYL     As needed for bacterial vaginal infections.        paragard intrauterine copper      1 each by Intrauterine route once Due to be changes in December 2018        tretinoin 0.1 % cream    RETIN-A    45 g    Apply topically At Bedtime     Other acne       valACYclovir 1000 mg tablet    VALTREX    90 tablet    Take 1 tablet (1,000 mg) by mouth daily    Herpes simplex virus infection       VOLTAREN 1 % Gel topical gel   Generic drug:  diclofenac      As needed for back pain

## 2017-11-21 NOTE — NURSING NOTE
"Chief Complaint   Patient presents with     Anxiety       Initial /50 (BP Location: Right arm, Patient Position: Chair, Cuff Size: Adult Regular)  Pulse 80  Temp 98.1  F (36.7  C) (Tympanic)  Resp 12  Ht 5' 5\" (1.651 m)  Wt 138 lb (62.6 kg)  BMI 22.96 kg/m2 Estimated body mass index is 22.96 kg/(m^2) as calculated from the following:    Height as of this encounter: 5' 5\" (1.651 m).    Weight as of this encounter: 138 lb (62.6 kg).  Medication Reconciliation: complete    Health Maintenance that is potentially due pending provider review:  NONE        Is there anyone who you would like to be able to receive your results? No  If yes have patient fill out EBONY      "

## 2017-11-21 NOTE — PROGRESS NOTES
HPI      SUBJECTIVE:   Anastacia Paez is a 37 year old female who presents to clinic today for follow-up of her anxiety and depression. She is doing very well on her current medication which includes alprazolam which she rarely takes more than once per day and citalopram 10 mg. She tried to discontinue these medications which were initially started because of postpartum depression. She has done very well with the medication when she attempted to discontinue this depression returned.          Anxiety Follow-Up    Status since last visit: No change    Other associated symptoms:None    Complicating factors:   Significant life event: No   Current substance abuse: None  Depression symptoms: No  No flowsheet data found.    GAD7              Problem list and histories reviewed & adjusted, as indicated.  Additional history: as documented    Patient Active Problem List   Diagnosis     Seasonal allergic rhinitis     Encounter for initial prescription of intrauterine contraceptive device     Panic attack     Bilateral low back pain without sciatica     Dysmenorrhea     Anxiety     Herpes simplex virus infection     Sun-damaged skin     Lichen simplex chronicus     History reviewed. No pertinent surgical history.    Social History   Substance Use Topics     Smoking status: Former Smoker     Quit date: 1/1/2004     Smokeless tobacco: Never Used     Alcohol use Yes      Comment: occasionally     History reviewed. No pertinent family history.      Current Outpatient Prescriptions   Medication Sig Dispense Refill     citalopram (CELEXA) 10 MG tablet Take 1 tablet (10 mg) by mouth daily 90 tablet 3     ALPRAZolam (XANAX) 0.5 MG tablet Take 1 tablet (0.5 mg) by mouth 3 times daily as needed for anxiety 90 tablet 3     augmented betamethasone dipropionate (DIPROLENE-AF) 0.05 % cream Apply to AA BID x 2-3 weeks then PRN 50 g 1     valACYclovir (VALTREX) 1000 mg tablet Take 1 tablet (1,000 mg) by mouth daily 90 tablet 1     metroNIDAZOLE  (FLAGYL) 500 MG tablet As needed for bacterial vaginal infections.       tretinoin (RETIN-A) 0.1 % cream Apply topically At Bedtime 45 g 1     diclofenac (VOLTAREN) 1 % GEL As needed for back pain       lidocaine (XYLOCAINE) 2 % jelly Apply topically as needed for moderate pain Three time daily as needed       paragard intrauterine copper 1 each by Intrauterine route once Due to be changes in December 2018       cetirizine (ZYRTEC) 10 MG tablet Take 10 mg by mouth daily       [DISCONTINUED] citalopram (CELEXA) 10 MG tablet Take 1 tablet (10 mg) by mouth daily 90 tablet 0     [DISCONTINUED] valACYclovir (VALTREX) 1000 mg tablet Take 1,000 mg by mouth 3 times daily       Allergies   Allergen Reactions     Morphine Itching     Labs reviewed in EPIC      Reviewed and updated as needed this visit by clinical staff     Reviewed and updated as needed this visit by Provider           Review of Systems   Constitutional: Negative for diaphoresis, fever, malaise/fatigue and weight loss.   HENT: Negative for congestion, ear discharge, ear pain, hearing loss, nosebleeds and sore throat.    Eyes: Negative for blurred vision, double vision, photophobia, pain, discharge and redness.   Respiratory: Negative for cough, hemoptysis, sputum production, shortness of breath and wheezing.    Cardiovascular: Negative for chest pain, palpitations, orthopnea and leg swelling.   Gastrointestinal: Negative for abdominal pain, blood in stool, constipation, diarrhea, heartburn, melena, nausea and vomiting.   Genitourinary: Negative.  Negative for dysuria, frequency and urgency.   Musculoskeletal: Negative for back pain, joint pain, myalgias and neck pain.   Skin: Negative for itching and rash.   Neurological: Negative.  Negative for dizziness, tingling, sensory change, focal weakness, seizures, loss of consciousness, weakness and headaches.   Endo/Heme/Allergies: Negative.    Psychiatric/Behavioral: Positive for depression. Negative for  hallucinations, substance abuse and suicidal ideas. The patient does not have insomnia.          Physical Exam   Constitutional: She is oriented to person, place, and time and well-developed, well-nourished, and in no distress. No distress.   HENT:   Head: Normocephalic and atraumatic.   Right Ear: External ear normal.   Left Ear: External ear normal.   Nose: Nose normal.   Eyes: Conjunctivae and EOM are normal. Pupils are equal, round, and reactive to light. Right eye exhibits no discharge. Left eye exhibits no discharge. No scleral icterus.   Neck: Normal range of motion. Neck supple. No JVD present. No tracheal deviation present. No thyromegaly present.   Cardiovascular: Normal rate, regular rhythm, normal heart sounds and intact distal pulses.  Exam reveals no gallop and no friction rub.    No murmur heard.  Pulmonary/Chest: Effort normal and breath sounds normal. No stridor. No respiratory distress. She has no wheezes. She has no rales. She exhibits no tenderness.   Abdominal: Soft. Bowel sounds are normal. She exhibits no distension and no mass. There is no tenderness. There is no rebound and no guarding.   Musculoskeletal: Normal range of motion. She exhibits no edema or tenderness.   Lymphadenopathy:     She has no cervical adenopathy.   Neurological: She is alert and oriented to person, place, and time. She has normal reflexes. No cranial nerve deficit. She exhibits normal muscle tone. Gait normal.   Skin: Skin is warm and dry. No rash noted. She is not diaphoretic. No erythema. No pallor.   Psychiatric: Mood, memory, affect and judgment normal.       (F41.9) Anxiety  Comment:   Plan: citalopram (CELEXA) 10 MG tablet, ALPRAZolam         (XANAX) 0.5 MG tablet          Depression is in remission at this time and she will continue citalopram and occasionally uses alprazolam for anxiety.

## 2017-11-22 ASSESSMENT — ANXIETY QUESTIONNAIRES: GAD7 TOTAL SCORE: 2

## 2018-03-09 ENCOUNTER — MYC REFILL (OUTPATIENT)
Dept: FAMILY MEDICINE | Facility: CLINIC | Age: 38
End: 2018-03-09

## 2018-03-09 DIAGNOSIS — F41.9 ANXIETY: ICD-10-CM

## 2018-03-09 RX ORDER — ALPRAZOLAM 0.5 MG
0.5 TABLET ORAL 3 TIMES DAILY PRN
Qty: 90 TABLET | Refills: 3 | Status: SHIPPED | OUTPATIENT
Start: 2018-03-09 | End: 2018-07-30

## 2018-03-09 NOTE — TELEPHONE ENCOUNTER
Message from [facility]:  Emeli Wolfe RN Fri Mar 9, 2018 9:59 AM        ----- Message -----   From: Anastacia Paez   Sent: 3/9/2018 8:44 AM   To: Ana M Luciano Care Team Pool  Subject: Medication Renewal Request     Original authorizing provider: ANGELA Pagan would like a refill of the following medications:  ALPRAZolam (XANAX) 0.5 MG tablet [Regino Luciano PA-C]    Preferred pharmacy: [facility] [id number] [place] [address]    Comment:

## 2018-04-30 ENCOUNTER — TELEPHONE (OUTPATIENT)
Dept: FAMILY MEDICINE | Facility: CLINIC | Age: 38
End: 2018-04-30

## 2018-04-30 NOTE — TELEPHONE ENCOUNTER
Patient is wondering if Dr. Miller would do an IUD check and replacement on her since her practice is closed? Please advise.    Catherine Kunz-[facility] [place]

## 2018-04-30 NOTE — TELEPHONE ENCOUNTER
Patient notified. She currently has an appointment scheduled with Dr. Brasher in [place] as she could get in sooner with him.    Demi Ayers   [facility]

## 2018-05-02 ENCOUNTER — OFFICE VISIT (OUTPATIENT)
Dept: FAMILY MEDICINE | Facility: CLINIC | Age: 38
End: 2018-05-02
Payer: COMMERCIAL

## 2018-05-02 ENCOUNTER — TELEPHONE (OUTPATIENT)
Dept: FAMILY MEDICINE | Facility: CLINIC | Age: 38
End: 2018-05-02

## 2018-05-02 VITALS
HEIGHT: 65 IN | HEART RATE: 70 BPM | DIASTOLIC BLOOD PRESSURE: 61 MMHG | TEMPERATURE: 98.4 F | SYSTOLIC BLOOD PRESSURE: 111 MMHG | RESPIRATION RATE: 16 BRPM | BODY MASS INDEX: 23.32 KG/M2 | WEIGHT: 140 LBS

## 2018-05-02 DIAGNOSIS — N94.10 DYSPAREUNIA, FEMALE: Primary | ICD-10-CM

## 2018-05-02 LAB
SPECIMEN SOURCE: NORMAL
WET PREP SPEC: NORMAL

## 2018-05-02 PROCEDURE — 87210 SMEAR WET MOUNT SALINE/INK: CPT | Performed by: FAMILY MEDICINE

## 2018-05-02 PROCEDURE — 87591 N.GONORRHOEAE DNA AMP PROB: CPT | Performed by: FAMILY MEDICINE

## 2018-05-02 PROCEDURE — 99000 SPECIMEN HANDLING OFFICE-LAB: CPT | Performed by: FAMILY MEDICINE

## 2018-05-02 PROCEDURE — 87491 CHLMYD TRACH DNA AMP PROBE: CPT | Performed by: FAMILY MEDICINE

## 2018-05-02 PROCEDURE — 99214 OFFICE O/P EST MOD 30 MIN: CPT | Performed by: FAMILY MEDICINE

## 2018-05-02 PROCEDURE — 87798 DETECT AGENT NOS DNA AMP: CPT | Mod: 90 | Performed by: FAMILY MEDICINE

## 2018-05-02 NOTE — MR AVS SNAPSHOT
After Visit Summary   5/2/2018    Anastacia Paez    MRN: [id number]           Patient Information     Date Of Birth          1980        Visit Information        Provider Department      5/2/2018 2:20 PM Jose Brasher MD [facility]        Today's Diagnoses     Dyspareunia, female    -  1      Care Instructions    You will be contacted in 2-3 business days for results of your lab tests.  No obvious abnormal signs on exam today to explain the painful intercourse.    However, due to your description of deep seated pain during intercourse a pelvic ultrasound can be helpful in ruling out possible causes. To schedule the ultrasound, call [phone].    Further recommendation to be given to you today.    You preferred to have your pap done when it's due, which is on or after August 2018.        Thank you for choosing [facility].  You may be receiving a survey in the mail from [employer] regarding your visit today.  Please take a few minutes to complete and return the survey to let us know how we are doing.      If you have questions or concerns, please contact us via [employer] or you can contact your care team at [phone].    Our Clinic hours are:  Monday 6:40 am  to 7:00 pm  Tuesday -Friday 6:40 am to 5:00 pm    The [place] outpatient lab hours are:  Monday - Friday 6:10 am to 4:45 pm  Saturdays 7:00 am to 11:00 am  Appointments are required, call [phone]    If you have clinical questions after hours or would like to schedule an appointment,  call the clinic at [phone].    Pelvic Pain, Uncertain Cause    Pelvic pain is pain felt in the lowest part of the belly (abdomen) and between the hipbones. The pain may occur suddenly and recently (acute). Or the pain may last for 6 months or longer (chronic).  There are many possible causes of pelvic pain. The pain may be due to a problem in the female reproductive system. Or, it may be due to a problem in the  digestive, urinary, or musculoskeletal systems.  Based on your visit today, the exact cause of your pelvic pain is not certain. Your condition does not appear to be serious at this time. But it is important for you to keep watching for any new symptoms or worsening of your condition.  General care  Your healthcare provider may advise a number of ways to help manage your pain. These can include:    Taking over-the-counter pain medicine. Stronger pain medicine may also be prescribed, if needed.    Applying heat to the pelvic area. Use a heating pad or a hot pack. Taking a hot bath may also help.    Getting plenty of rest.    Making certain lifestyle changes. These can include practicing good posture and getting regular exercise. Studies have shown that these changes help reduce pelvic pain in some women.    Seeing a physical therapist or pain specialist. These healthcare providers can discuss other ways to manage pain with you.  Follow-up care  Follow up with your healthcare provider, or as advised.   When to seek medical advice  Call your healthcare provider right away if any of the following occur:    Fever of 100.4 F or higher, or as directed by your healthcare provider    Pain worsens or you have sudden, severe pain or new pain    Nausea, vomiting, sweating, or restlessness    Dizziness or fainting    Unusual vaginal discharge    Abnormal vaginal bleeding (especially bleeding after menopause)  Date Last Reviewed: 10/1/2017    [id number] [employer]. [address] [place] [address], [place] [address]. All rights reserved. This information is not intended as a substitute for professional medical care. Always follow your healthcare professional's instructions.                Follow-ups after your visit        Future tests that were ordered for you today     Open Future Orders        Priority Expected Expires Ordered    US Pelvic Complete w Transvaginal Routine  5/2/2019 5/2/2018            Who to contact     If  "you have questions or need follow up information about today's clinic visit or your schedule please contact [facility] directly at [phone].  Normal or non-critical lab and imaging results will be communicated to you by MyChart, letter or phone within 4 business days after the clinic has received the results. If you do not hear from us within 7 days, please contact the clinic through [employer] or phone. If you have a critical or abnormal lab result, we will notify you by phone as soon as possible.  Submit refill requests through [facility] or call your pharmacy and they will forward the refill request to us. Please allow 3 business days for your refill to be completed.          Additional Information About Your Visit        [employer] Information     [facility] gives you secure access to your electronic health record. If you see a primary care provider, you can also send messages to your care team and make appointments. If you have questions, please call your primary care clinic.  If you do not have a primary care provider, please call [phone] and they will assist you.        Care EveryWhere ID     This is your Care EveryWhere ID. This could be used by other organizations to access your [place] medical records  [id number]        Your Vitals Were     Pulse Temperature Respirations Height Last Period BMI (Body Mass Index)    70 98.4  F (36.9  C) (Tympanic) 16 5' 4.75\" (1.645 m) 04/10/2018 (Exact Date) 23.48 kg/m2       Blood Pressure from Last 3 Encounters:   05/02/18 111/61   11/21/17 106/50   10/19/17 109/60    Weight from Last 3 Encounters:   05/02/18 140 lb (63.5 kg)   11/21/17 138 lb (62.6 kg)   10/19/17 147 lb (66.7 kg)              We Performed the Following     Chlamydia trachomatis PCR     Neisseria gonorrhoeae PCR     Ureaplasma species PCR     Wet prep        Primary Care Provider Office Phone # Fax #    Regino Luciano PA-C [phone] [address] [place] [address] " [phone]        Equal Access to Services     [facility] : Hadii aad ku hadjulineto Pilyali, wacecilyda luqmitchelha, qaybta emilymiguelyusuf william, ca gonzalezalma rosagianna nelson. So [facility] [phone].    ATENCIÓN: Si habla español, tiene a gilbert disposición servicios gratuitos de asistencia lingüística. Leanna al [phone].    We comply with applicable federal civil rights laws and [place] laws. We do not discriminate on the basis of race, color, national origin, age, disability, sex, sexual orientation, or gender identity.            Thank you!     Thank you for choosing [facility]  for your care. Our goal is always to provide you with excellent care. Hearing back from our patients is one way we can continue to improve our services. Please take a few minutes to complete the written survey that you may receive in the mail after your visit with us. Thank you!             Your Updated Medication List - Protect others around you: Learn how to safely use, store and throw away your medicines at www.disposemymeds.org.          This list is accurate as of 5/2/18  3:05 PM.  Always use your most recent med list.                   Brand Name Dispense Instructions for use Diagnosis    ALPRAZolam 0.5 MG tablet    XANAX    90 tablet    Take 1 tablet (0.5 mg) by mouth 3 times daily as needed for anxiety    Anxiety       augmented betamethasone dipropionate 0.05 % cream    DIPROLENE-AF    50 g    Apply to AA BID x 2-3 weeks then PRN    Lichen simplex chronicus       cetirizine 10 MG tablet    zyrTEC     Take 10 mg by mouth daily        citalopram 10 MG tablet    celeXA    90 tablet    Take 1 tablet (10 mg) by mouth daily    Anxiety       lidocaine 2 % topical gel    XYLOCAINE     Apply topically as needed for moderate pain Three time daily as needed        metroNIDAZOLE 500 MG tablet    FLAGYL     As needed for bacterial vaginal infections.        paragard intrauterine copper      1 each by Intrauterine route once Due  to be changes in December 2018        tretinoin 0.1 % cream    RETIN-A    45 g    Apply topically At Bedtime    Other acne       valACYclovir 1000 mg tablet    VALTREX    90 tablet    Take 1 tablet (1,000 mg) by mouth daily    Herpes simplex virus infection       VOLTAREN 1 % Gel topical gel   Generic drug:  diclofenac      As needed for back pain

## 2018-05-02 NOTE — PROGRESS NOTES
SUBJECTIVE:   Anastacia Paez is a 38 year old female who presents to clinic today for the following health issues:      Chief Complaint   Patient presents with     Gyn Exam     Patient would like to have pap done today, does not need a full physical.  Have some discomfort with intercourse that she would like checked. No new partners.  Is due to have IUD out in Dec.  Has some questions regarding PMS.          Vaginal Symptoms      Duration: 1 week    Description  pain with intercourse    Intensity:  mild, moderate    Accompanying signs and symptoms (fever/dysuria/abdominal or back pain): patient denies foul odor, increased discharge or vaginal bleeding.    History  Sexually active: yes, single partner, contraception - IUD  Possibility of pregnancy: No  Recent antibiotic use: no     Precipitating or alleviating factors: stopping intercourse; position change during intercourse    Therapies tried and outcome: none   Outcome: n/a      Problem list and histories reviewed & adjusted, as indicated.  Additional history: as documented    Patient Active Problem List   Diagnosis     Seasonal allergic rhinitis     Encounter for initial prescription of intrauterine contraceptive device     Panic attack     Bilateral low back pain without sciatica     Dysmenorrhea     Anxiety     Herpes simplex virus infection     Sun-damaged skin     Lichen simplex chronicus     History reviewed. No pertinent surgical history.    Social History   Substance Use Topics     Smoking status: Former Smoker     Quit date: 1/1/2004     Smokeless tobacco: Never Used     Alcohol use Yes      Comment: occasionally     Family History   Problem Relation Age of Onset     HEART DISEASE Father      CHF     Other - See Comments Father      lung transplant due to agent orange     Other - See Comments Daughter      issues with ears         Current Outpatient Prescriptions   Medication Sig Dispense Refill     ALPRAZolam (XANAX) 0.5 MG tablet Take 1 tablet (0.5 mg)  "by mouth 3 times daily as needed for anxiety 90 tablet 3     augmented betamethasone dipropionate (DIPROLENE-AF) 0.05 % cream Apply to AA BID x 2-3 weeks then PRN 50 g 1     cetirizine (ZYRTEC) 10 MG tablet Take 10 mg by mouth daily       citalopram (CELEXA) 10 MG tablet Take 1 tablet (10 mg) by mouth daily 90 tablet 3     diclofenac (VOLTAREN) 1 % GEL As needed for back pain       lidocaine (XYLOCAINE) 2 % jelly Apply topically as needed for moderate pain Three time daily as needed       metroNIDAZOLE (FLAGYL) 500 MG tablet As needed for bacterial vaginal infections.       paragard intrauterine copper 1 each by Intrauterine route once Due to be changes in December 2018       tretinoin (RETIN-A) 0.1 % cream Apply topically At Bedtime 45 g 1     valACYclovir (VALTREX) 1000 mg tablet Take 1 tablet (1,000 mg) by mouth daily 90 tablet 1     Allergies   Allergen Reactions     Morphine Itching       Reviewed and updated as needed this visit by clinical staff  Tobacco  Allergies  Meds  Problems  Med Hx  Surg Hx  Fam Hx  Soc Hx        Reviewed and updated as needed this visit by Provider  Allergies  Meds  Problems         ROS:  CONSTITUTIONAL: NEGATIVE for fever, chills, change in weight  INTEGUMENTARY/SKIN: NEGATIVE for worrisome rashes, moles or lesions  RESP: NEGATIVE for significant cough or SOB  CV: NEGATIVE for chest pain, palpitations or peripheral edema  GI: NEGATIVE for nausea, abdominal pain, heartburn, or change in bowel habits  : NEGATIVE for frequency, dysuria, or hematuria   female: see above  MUSCULOSKELETAL: NEGATIVE for significant arthralgias or myalgia  HEME: NEGATIVE for bleeding problems    OBJECTIVE:                                                    /61 (BP Location: Right arm, Patient Position: Chair, Cuff Size: Adult Regular)  Pulse 70  Temp 98.4  F (36.9  C) (Tympanic)  Resp 16  Ht 5' 4.75\" (1.645 m)  Wt 140 lb (63.5 kg)  LMP 04/10/2018 (Exact Date)  BMI 23.48 kg/m2  Body " mass index is 23.48 kg/(m^2).  GENERAL: alert and no distress, ambulatory w/o assist  SKIN: no suspicious lesions, no rashes    ABD:  Flat, no TTP , no mass palpable    SPECULUM EXAM: normal external genitalia, normal vaginal mucosae, scant creamy white mucoid vaginal discharge; cervix visualized fully smooth and non-erythematous with no blood per os but with visible IUD string and scant mucoid white material    BIMANUAL EXAM: mild CMT, uterus small and non-tender; no left adnexal tenderness, no  right adnexal tenderness; no adnexal mass on either side    Diagnostic test results:  Diagnostic Test Results:  none      ASSESSMENT/PLAN:                                                        ICD-10-CM    1. Dyspareunia, female N94.10 Neisseria gonorrhoeae PCR     Chlamydia trachomatis PCR     Ureaplasma species PCR     US Pelvic Complete w Transvaginal     Wet prep     No visual sign of cervicitis but has mild pain on movement of cervix.  DDx: STI, trichomoniasis, ureaplasma, endometriosis  Patient was advised of the above.  She concurred with work up as listed.  Ibuprofen 200 mg 1-2 tablets with food every 8 hrs as needed for pain.    Patient was reminded of due pap in Aug 2018 - she preferred to wait for that time.    Patient was advisedno sign to indicate that IUD is being extruded through cervix. No clear indication to remove at this time,  She will schedule removal and possible replacement when due later this year with provider that performs paragard placement.        Follow up with Provider - prn   Patient Instructions   You will be contacted in 2-3 business days for results of your lab tests.  No obvious abnormal signs on exam today to explain the painful intercourse.    However, due to your description of deep seated pain during intercourse a pelvic ultrasound can be helpful in ruling out possible causes. To schedule the ultrasound, call [phone].    Further recommendation to be given to you today.    You  preferred to have your pap done when it's due, which is on or after August 2018.        Thank you for choosing [facility].  You may be receiving a survey in the mail from [employer] regarding your visit today.  Please take a few minutes to complete and return the survey to let us know how we are doing.      If you have questions or concerns, please contact us via [employer] or you can contact your care team at [phone].    Our Clinic hours are:  Monday 6:40 am  to 7:00 pm  Tuesday -Friday 6:40 am to 5:00 pm    The [place] outpatient lab hours are:  Monday - Friday 6:10 am to 4:45 pm  Saturdays 7:00 am to 11:00 am  Appointments are required, call [phone]    If you have clinical questions after hours or would like to schedule an appointment,  call the clinic at [phone].    Pelvic Pain, Uncertain Cause    Pelvic pain is pain felt in the lowest part of the belly (abdomen) and between the hipbones. The pain may occur suddenly and recently (acute). Or the pain may last for 6 months or longer (chronic).  There are many possible causes of pelvic pain. The pain may be due to a problem in the female reproductive system. Or, it may be due to a problem in the digestive, urinary, or musculoskeletal systems.  Based on your visit today, the exact cause of your pelvic pain is not certain. Your condition does not appear to be serious at this time. But it is important for you to keep watching for any new symptoms or worsening of your condition.  General care  Your healthcare provider may advise a number of ways to help manage your pain. These can include:    Taking over-the-counter pain medicine. Stronger pain medicine may also be prescribed, if needed.    Applying heat to the pelvic area. Use a heating pad or a hot pack. Taking a hot bath may also help.    Getting plenty of rest.    Making certain lifestyle changes. These can include practicing good posture and getting regular exercise. Studies have shown that  these changes help reduce pelvic pain in some women.    Seeing a physical therapist or pain specialist. These healthcare providers can discuss other ways to manage pain with you.  Follow-up care  Follow up with your healthcare provider, or as advised.   When to seek medical advice  Call your healthcare provider right away if any of the following occur:    Fever of 100.4 F or higher, or as directed by your healthcare provider    Pain worsens or you have sudden, severe pain or new pain    Nausea, vomiting, sweating, or restlessness    Dizziness or fainting    Unusual vaginal discharge    Abnormal vaginal bleeding (especially bleeding after menopause)  Date Last Reviewed: 10/1/2017    [id number] [employer]. [address] [place] [address], [place] [address]. All rights reserved. This information is not intended as a substitute for professional medical care. Always follow your healthcare professional's instructions.            Jose Brasher MD  [facility]

## 2018-05-02 NOTE — TELEPHONE ENCOUNTER
Patient is scheduled for IUD removal and possible replacement today.  According to our records we have paragard IUD on her med list (patient reported).  Dr. Brasher is able to remove the IUD but does not insert the paragard IUD, he would not be able to do that.  She can reschedule with a provider that does that IUD or if she would like to come in and discuss options keep the appointment for today.    Left message for patient to return call.

## 2018-05-02 NOTE — NURSING NOTE
"Chief Complaint   Patient presents with     Gyn Exam     Patient would like to have pap done today, does not need a full physical.  Have some discomfort with intercourse that she would like checked. No new partners.  Is due to have IUD out in Dec.       Initial /61 (BP Location: Right arm, Patient Position: Chair, Cuff Size: Adult Regular)  Pulse 70  Temp 98.4  F (36.9  C) (Tympanic)  Resp 16  Ht 5' 4.75\" (1.645 m)  Wt 140 lb (63.5 kg)  LMP 04/10/2018 (Exact Date)  BMI 23.48 kg/m2 Estimated body mass index is 23.48 kg/(m^2) as calculated from the following:    Height as of this encounter: 5' 4.75\" (1.645 m).    Weight as of this encounter: 140 lb (63.5 kg).  Medication Reconciliation: complete  "

## 2018-05-03 LAB
C TRACH DNA SPEC QL NAA+PROBE: NEGATIVE
N GONORRHOEA DNA SPEC QL NAA+PROBE: NEGATIVE
SPECIMEN SOURCE: NORMAL
SPECIMEN SOURCE: NORMAL

## 2018-05-04 ENCOUNTER — TELEPHONE (OUTPATIENT)
Dept: FAMILY MEDICINE | Facility: CLINIC | Age: 38
End: 2018-05-04

## 2018-05-04 DIAGNOSIS — N72 CERVICITIS: Primary | ICD-10-CM

## 2018-05-04 LAB
SPECIMEN SOURCE: ABNORMAL
U PARVUM DNA SPEC QL NAA+PROBE: POSITIVE
U UREALYTICUM DNA SPEC QL NAA+PROBE: NEGATIVE

## 2018-05-04 RX ORDER — DOXYCYCLINE HYCLATE 100 MG
100 TABLET ORAL 2 TIMES DAILY
Qty: 14 TABLET | Refills: 0 | Status: SHIPPED | OUTPATIENT
Start: 2018-05-04 | End: 2018-07-30

## 2018-05-04 NOTE — PATIENT INSTRUCTIONS
Mycoplasma (Genital)  Does this test have other names?  Mycoplasma culture  What is this test?  This test looks for microorganisms in a sample of secretions from your genital area.  Mycoplasma are the smallest free-living organisms. They aren't bacteria or viruses. They don't have cell walls and can be many different shapes.  Three species of mycoplasma are closely related: Mycoplasma hominis, Mycoplasma genitalium and ureaplasma spp. These may be present in women who have a urinary tract infection, gynecological infection, vaginal discharge, or pelvic inflammatory disease (PID). In men, mycoplasma may be present in some sexually transmitted infections.  If a pregnant woman has these microorganisms in her birth canal, her unborn baby can be exposed to them. They may grow in an infant for a few years, causing infections that affect the entire body.  For this test, a sample from the cervix in women or the urethra in men is sent to the lab. There it's placed in a culture medium to see if the microorganisms grow.   Why do I need this test?  You may need this test if you have symptoms of a genital or urinary tract infection. Finding out the cause of your infection helps your healthcare provider figure out how to treat it. Symptoms in women include:    Cervical and pelvic pain    Bleeding after intercourse or in between menstrual periods    Vaginal discharge   Symptoms in men include:    Inflammation of the urethra    Itching and tingling of the urethra    Penile discharge  Not all infections have symptoms.  What other tests might I have along with this test?  Your healthcare provider may also order these tests:    Culture tests for other diseases of the genital and urinary tract. These might be gonorrhea, chlamydia, or trichomoniasis, to rule out these infections    Polymerase chain reaction test (PCR) to find out the strain of mycoplasma. A PCR test may be better than a mycoplasma culture for finding mycoplasma in  genital secretions.  How is this test done?  This test is done with a sample of secretions from your genital area. If you're a woman, your healthcare provider will collect the sample by placing a cotton swab on your cervix. If you're a man, your provider will place the swab in your urethra.   What do my test results mean?  Test results may vary depending on your age, gender, health history, the method used for the test, and other things. Your test results may not mean you have a problem. Ask your healthcare provider what your test results mean for you.   Normal results are negative, meaning that no mycoplasma were found in the sample.  Positive results mean that mycoplasma were found and that you may have a sexually transmitted disease or PID. But some mycoplasma may be present without causing disease.  Does this test pose any risks?  This test poses no known risks.  What might affect my test results?  In women, using lubricants, douches, and disinfectants can affect results, as can your monthly period. In men, urinating within an hour before testing may affect results.  How do I get ready for this test?  Women should not use a douche or disinfectants the day before testing. Men should not urinate 1 hour before testing. Be sure your healthcare provider knows about all medicines, herbs, vitamins, and supplements you are taking. This includes medicines that don't need a prescription and any illicit drugs you may use.     3464-5860 [employer]. [address] [place] [address]. All rights reserved. This information is not intended as a substitute for professional medical care. Always follow your healthcare professional's instructions.        Mycoplasma (Genital)  Does this test have other names?  Mycoplasma culture  What is this test?  This test looks for microorganisms in a sample of secretions from your genital area.  Mycoplasma are the smallest free-living organisms. They aren't bacteria or  viruses. They don't have cell walls and can be many different shapes.  Three species of mycoplasma are closely related: Mycoplasma hominis, Mycoplasma genitalium and ureaplasma spp. These may be present in women who have a urinary tract infection, gynecological infection, vaginal discharge, or pelvic inflammatory disease (PID). In men, mycoplasma may be present in some sexually transmitted infections.  If a pregnant woman has these microorganisms in her birth canal, her unborn baby can be exposed to them. They may grow in an infant for a few years, causing infections that affect the entire body.  For this test, a sample from the cervix in women or the urethra in men is sent to the lab. There it's placed in a culture medium to see if the microorganisms grow.   Why do I need this test?  You may need this test if you have symptoms of a genital or urinary tract infection. Finding out the cause of your infection helps your healthcare provider figure out how to treat it. Symptoms in women include:    Cervical and pelvic pain    Bleeding after intercourse or in between menstrual periods    Vaginal discharge   Symptoms in men include:    Inflammation of the urethra    Itching and tingling of the urethra    Penile discharge  Not all infections have symptoms.  What other tests might I have along with this test?  Your healthcare provider may also order these tests:    Culture tests for other diseases of the genital and urinary tract. These might be gonorrhea, chlamydia, or trichomoniasis, to rule out these infections    Polymerase chain reaction test (PCR) to find out the strain of mycoplasma. A PCR test may be better than a mycoplasma culture for finding mycoplasma in genital secretions.  How is this test done?  This test is done with a sample of secretions from your genital area. If you're a woman, your healthcare provider will collect the sample by placing a cotton swab on your cervix. If you're a man, your provider will  place the swab in your urethra.   What do my test results mean?  Test results may vary depending on your age, gender, health history, the method used for the test, and other things. Your test results may not mean you have a problem. Ask your healthcare provider what your test results mean for you.   Normal results are negative, meaning that no mycoplasma were found in the sample.  Positive results mean that mycoplasma were found and that you may have a sexually transmitted disease or PID. But some mycoplasma may be present without causing disease.  Does this test pose any risks?  This test poses no known risks.  What might affect my test results?  In women, using lubricants, douches, and disinfectants can affect results, as can your monthly period. In men, urinating within an hour before testing may affect results.  How do I get ready for this test?  Women should not use a douche or disinfectants the day before testing. Men should not urinate 1 hour before testing. Be sure your healthcare provider knows about all medicines, herbs, vitamins, and supplements you are taking. This includes medicines that don't need a prescription and any illicit drugs you may use.     5395-4192 [employer]. [address] [place] [address], [place] [address]. All rights reserved. This information is not intended as a substitute for professional medical care. Always follow your healthcare professional's instructions.

## 2018-05-04 NOTE — TELEPHONE ENCOUNTER
Routed to Dr Sanchez  Please advise in Dr Brasher's absence.    1.  Pt's urine returned positive for plasma urea.  Treatment recommendations please.    2.  Does her partner need to be treated?    Zo Valente RN

## 2018-05-04 NOTE — TELEPHONE ENCOUNTER
Covering for provider:  Recommend treatment with doxycycline for 7 days.  Her partners should contact their physician regarding treatment.      Which pharmacy?    George Sanchez MD

## 2018-05-08 DIAGNOSIS — N72 CERVICITIS: Primary | ICD-10-CM

## 2018-07-02 ENCOUNTER — OFFICE VISIT (OUTPATIENT)
Dept: FAMILY MEDICINE | Facility: CLINIC | Age: 38
End: 2018-07-02
Payer: COMMERCIAL

## 2018-07-02 VITALS
DIASTOLIC BLOOD PRESSURE: 62 MMHG | OXYGEN SATURATION: 97 % | BODY MASS INDEX: 23.82 KG/M2 | RESPIRATION RATE: 20 BRPM | HEART RATE: 64 BPM | TEMPERATURE: 98.6 F | WEIGHT: 143 LBS | SYSTOLIC BLOOD PRESSURE: 96 MMHG | HEIGHT: 65 IN

## 2018-07-02 DIAGNOSIS — Z11.3 SCREEN FOR STD (SEXUALLY TRANSMITTED DISEASE): ICD-10-CM

## 2018-07-02 DIAGNOSIS — N89.8 VAGINAL DISCHARGE: ICD-10-CM

## 2018-07-02 DIAGNOSIS — Z30.432 ENCOUNTER FOR IUD REMOVAL: ICD-10-CM

## 2018-07-02 DIAGNOSIS — R10.2 PELVIC PAIN IN FEMALE: Primary | ICD-10-CM

## 2018-07-02 DIAGNOSIS — Z12.4 SCREENING FOR CERVICAL CANCER: ICD-10-CM

## 2018-07-02 DIAGNOSIS — N76.0 BACTERIAL VAGINOSIS: ICD-10-CM

## 2018-07-02 DIAGNOSIS — B96.89 BACTERIAL VAGINOSIS: ICD-10-CM

## 2018-07-02 LAB
SPECIMEN SOURCE: ABNORMAL
WET PREP SPEC: ABNORMAL

## 2018-07-02 PROCEDURE — G0145 SCR C/V CYTO,THINLAYER,RESCR: HCPCS | Performed by: NURSE PRACTITIONER

## 2018-07-02 PROCEDURE — 99000 SPECIMEN HANDLING OFFICE-LAB: CPT | Performed by: NURSE PRACTITIONER

## 2018-07-02 PROCEDURE — 87491 CHLMYD TRACH DNA AMP PROBE: CPT | Performed by: NURSE PRACTITIONER

## 2018-07-02 PROCEDURE — 99214 OFFICE O/P EST MOD 30 MIN: CPT | Mod: 25 | Performed by: NURSE PRACTITIONER

## 2018-07-02 PROCEDURE — 58301 REMOVE INTRAUTERINE DEVICE: CPT | Performed by: NURSE PRACTITIONER

## 2018-07-02 PROCEDURE — 87624 HPV HI-RISK TYP POOLED RSLT: CPT | Performed by: NURSE PRACTITIONER

## 2018-07-02 PROCEDURE — 87798 DETECT AGENT NOS DNA AMP: CPT | Mod: 90 | Performed by: NURSE PRACTITIONER

## 2018-07-02 PROCEDURE — 87591 N.GONORRHOEAE DNA AMP PROB: CPT | Performed by: NURSE PRACTITIONER

## 2018-07-02 PROCEDURE — 87210 SMEAR WET MOUNT SALINE/INK: CPT | Performed by: NURSE PRACTITIONER

## 2018-07-02 NOTE — MR AVS SNAPSHOT
After Visit Summary   7/2/2018    Anastacia Paez    MRN: [id number]           Patient Information     Date Of Birth          1980        Visit Information        Provider Department      7/2/2018 3:40 PM Kathryn Renteria APRN CNP [facility]        Today's Diagnoses     Vaginal discharge    -  1    Screening for cervical cancer        Screen for STD (sexually transmitted disease)        Pelvic pain in female          Care Instructions    Please call [phone] to schedule your pelvic ultrasound          Follow-ups after your visit        Future tests that were ordered for you today     Open Future Orders        Priority Expected Expires Ordered    US Pelvic Complete with Transvaginal Routine  7/3/2019 7/2/2018            Who to contact     If you have questions or need follow up information about today's clinic visit or your schedule please contact [facility] directly at [phone].  Normal or non-critical lab and imaging results will be communicated to you by [employer], letter or phone within 4 business days after the clinic has received the results. If you do not hear from us within 7 days, please contact the clinic through [employer] or phone. If you have a critical or abnormal lab result, we will notify you by phone as soon as possible.  Submit refill requests through [employer] or call your pharmacy and they will forward the refill request to us. Please allow 3 business days for your refill to be completed.          Additional Information About Your Visit        MyChart Information     [employer] gives you secure access to your electronic health record. If you see a primary care provider, you can also send messages to your care team and make appointments. If you have questions, please call your primary care clinic.  If you do not have a primary care provider, please call [phone] and they will assist you.        Care EveryWhere ID     This is your Care  "EveryWhere ID. This could be used by other organizations to access your [place] medical records  [id number]        Your Vitals Were     Pulse Temperature Respirations Height Last Period Pulse Oximetry    64 98.6  F (37  C) (Tympanic) 20 5' 4.75\" (1.645 m) 06/12/2018 (Approximate) 97%    BMI (Body Mass Index)                   23.98 kg/m2            Blood Pressure from Last 3 Encounters:   07/02/18 96/62   05/02/18 111/61   11/21/17 106/50    Weight from Last 3 Encounters:   07/02/18 143 lb (64.9 kg)   05/02/18 140 lb (63.5 kg)   11/21/17 138 lb (62.6 kg)              We Performed the Following     Chlamydia trachomatis PCR     Neisseria gonorrhoeae PCR     PAP imaged thin layer screen     Ureaplasma species PCR     Wet prep        Primary Care Provider Office Phone # Fax #    Regino Luciano PA-C [phone] [address] [place] [address]        Equal Access to Services     PARVIN LAUGHLIN : Hadii aad ku hadasho Soomaali, waaxda luqadaha, qaybta kaalmada adeegyada, waxay nilayin haynico boswell . So [facility] [phone].    ATENCIÓN: Si habla español, tiene a gilbert disposición servicios gratuitos de asistencia lingüística. Llame al [phone].    We comply with applicable federal civil rights laws and [place] laws. We do not discriminate on the basis of race, color, national origin, age, disability, sex, sexual orientation, or gender identity.            Thank you!     Thank you for choosing [facility]  for your care. Our goal is always to provide you with excellent care. Hearing back from our patients is one way we can continue to improve our services. Please take a few minutes to complete the written survey that you may receive in the mail after your visit with us. Thank you!             Your Updated Medication List - Protect others around you: Learn how to safely use, store and throw away your medicines at www.disposemymeds.org.          This list is accurate " as of 7/2/18  4:33 PM.  Always use your most recent med list.                   Brand Name Dispense Instructions for use Diagnosis    ALPRAZolam 0.5 MG tablet    XANAX    90 tablet    Take 1 tablet (0.5 mg) by mouth 3 times daily as needed for anxiety    Anxiety       augmented betamethasone dipropionate 0.05 % cream    DIPROLENE-AF    50 g    Apply to AA BID x 2-3 weeks then PRN    Lichen simplex chronicus       cetirizine 10 MG tablet    zyrTEC     Take 10 mg by mouth daily        citalopram 10 MG tablet    celeXA    90 tablet    Take 1 tablet (10 mg) by mouth daily    Anxiety       doxycycline 100 MG tablet    VIBRA-TABS    14 tablet    Take 1 tablet (100 mg) by mouth 2 times daily    Cervicitis       lidocaine 2 % topical gel    XYLOCAINE     Apply topically as needed for moderate pain Three time daily as needed        metroNIDAZOLE 500 MG tablet    FLAGYL     As needed for bacterial vaginal infections.        paragard intrauterine copper      1 each by Intrauterine route once Due to be changes in December 2018        tretinoin 0.1 % cream    RETIN-A    45 g    Apply topically At Bedtime    Other acne       valACYclovir 1000 mg tablet    VALTREX    90 tablet    Take 1 tablet (1,000 mg) by mouth daily    Herpes simplex virus infection       VOLTAREN 1 % Gel topical gel   Generic drug:  diclofenac      As needed for back pain

## 2018-07-02 NOTE — NURSING NOTE
"Chief Complaint   Patient presents with     Vaginal Problem       Initial BP 96/62  Pulse 64  Temp 98.6  F (37  C) (Tympanic)  Resp 20  Ht 5' 4.75\" (1.645 m)  Wt 143 lb (64.9 kg)  LMP 06/12/2018 (Approximate)  SpO2 97%  BMI 23.98 kg/m2 Estimated body mass index is 23.98 kg/(m^2) as calculated from the following:    Height as of this encounter: 5' 4.75\" (1.645 m).    Weight as of this encounter: 143 lb (64.9 kg).      Health Maintenance that is potentially due pending provider review:  NONE    n/a    Is there anyone who you would like to be able to receive your results? No  If yes have patient fill out EBONY      "

## 2018-07-03 RX ORDER — METRONIDAZOLE 500 MG/1
500 TABLET ORAL 2 TIMES DAILY
Qty: 14 TABLET | Refills: 0 | Status: SHIPPED | OUTPATIENT
Start: 2018-07-03 | End: 2018-07-30

## 2018-07-05 LAB
COPATH REPORT: NORMAL
PAP: NORMAL
SPECIMEN SOURCE: NORMAL
U PARVUM DNA SPEC QL NAA+PROBE: NEGATIVE
U UREALYTICUM DNA SPEC QL NAA+PROBE: NEGATIVE

## 2018-07-06 LAB
FINAL DIAGNOSIS: NORMAL
HPV HR 12 DNA CVX QL NAA+PROBE: NEGATIVE
HPV16 DNA SPEC QL NAA+PROBE: NEGATIVE
HPV18 DNA SPEC QL NAA+PROBE: NEGATIVE
SPECIMEN DESCRIPTION: NORMAL
SPECIMEN SOURCE CVX/VAG CYTO: NORMAL

## 2018-07-24 ENCOUNTER — MYC REFILL (OUTPATIENT)
Dept: FAMILY MEDICINE | Facility: CLINIC | Age: 38
End: 2018-07-24

## 2018-07-24 DIAGNOSIS — F41.9 ANXIETY: ICD-10-CM

## 2018-07-24 RX ORDER — ALPRAZOLAM 0.5 MG
0.5 TABLET ORAL 3 TIMES DAILY PRN
Qty: 90 TABLET | Refills: 3 | Status: CANCELLED | OUTPATIENT
Start: 2018-07-24

## 2018-07-24 NOTE — TELEPHONE ENCOUNTER
Message from Elevatet:  Original authorizing provider: ANGELA Pagan would like a refill of the following medications:  ALPRAZolam (XANAX) 0.5 MG tablet [Regino Luciano PA-C]    Preferred pharmacy: [facility], [place] - [address] [place] [address]    Comment:

## 2018-07-30 ENCOUNTER — OFFICE VISIT (OUTPATIENT)
Dept: FAMILY MEDICINE | Facility: CLINIC | Age: 38
End: 2018-07-30
Payer: COMMERCIAL

## 2018-07-30 VITALS
SYSTOLIC BLOOD PRESSURE: 110 MMHG | WEIGHT: 140 LBS | TEMPERATURE: 98.5 F | DIASTOLIC BLOOD PRESSURE: 62 MMHG | HEIGHT: 65 IN | BODY MASS INDEX: 23.32 KG/M2 | HEART RATE: 74 BPM

## 2018-07-30 DIAGNOSIS — F41.9 ANXIETY: ICD-10-CM

## 2018-07-30 PROCEDURE — 99213 OFFICE O/P EST LOW 20 MIN: CPT | Performed by: NURSE PRACTITIONER

## 2018-07-30 RX ORDER — CITALOPRAM HYDROBROMIDE 10 MG/1
10 TABLET ORAL DAILY
Qty: 90 TABLET | Refills: 3 | Status: SHIPPED | OUTPATIENT
Start: 2018-07-30 | End: 2018-08-22

## 2018-07-30 RX ORDER — ALPRAZOLAM 0.5 MG
0.5 TABLET ORAL 3 TIMES DAILY PRN
Qty: 90 TABLET | Refills: 1 | Status: SHIPPED | OUTPATIENT
Start: 2018-07-30 | End: 2018-10-15

## 2018-07-30 ASSESSMENT — ANXIETY QUESTIONNAIRES
2. NOT BEING ABLE TO STOP OR CONTROL WORRYING: SEVERAL DAYS
6. BECOMING EASILY ANNOYED OR IRRITABLE: SEVERAL DAYS
5. BEING SO RESTLESS THAT IT IS HARD TO SIT STILL: SEVERAL DAYS
7. FEELING AFRAID AS IF SOMETHING AWFUL MIGHT HAPPEN: NOT AT ALL
3. WORRYING TOO MUCH ABOUT DIFFERENT THINGS: SEVERAL DAYS
1. FEELING NERVOUS, ANXIOUS, OR ON EDGE: SEVERAL DAYS
GAD7 TOTAL SCORE: 5

## 2018-07-30 ASSESSMENT — PATIENT HEALTH QUESTIONNAIRE - PHQ9: 5. POOR APPETITE OR OVEREATING: NOT AT ALL

## 2018-07-30 NOTE — MR AVS SNAPSHOT
"              After Visit Summary   7/30/2018    Anastacia Paez    MRN: [id number]           Patient Information     Date Of Birth          1980        Visit Information        Provider Department      7/30/2018 3:00 PM Kathryn Renteria APRN CNP [facility]        Today's Diagnoses     Anxiety           Follow-ups after your visit        Who to contact     If you have questions or need follow up information about today's clinic visit or your schedule please contact [facility] directly at [phone].  Normal or non-critical lab and imaging results will be communicated to you by [employer], letter or phone within 4 business days after the clinic has received the results. If you do not hear from us within 7 days, please contact the clinic through [employer] or phone. If you have a critical or abnormal lab result, we will notify you by phone as soon as possible.  Submit refill requests through [email or web address] or call your pharmacy and they will forward the refill request to us. Please allow 3 business days for your refill to be completed.          Additional Information About Your Visit        MyChart Information     [email or web address] gives you secure access to your electronic health record. If you see a primary care provider, you can also send messages to your care team and make appointments. If you have questions, please call your primary care clinic.  If you do not have a primary care provider, please call [phone] and they will assist you.        Care EveryWhere ID     This is your Care EveryWhere ID. This could be used by other organizations to access your [place] medical records  [id number]        Your Vitals Were     Pulse Temperature Height BMI (Body Mass Index)          74 98.5  F (36.9  C) (Tympanic) 5' 4.75\" (1.645 m) 23.48 kg/m2         Blood Pressure from Last 3 Encounters:   07/30/18 110/62   07/02/18 96/62   05/02/18 111/61    Weight from Last 3 Encounters:   07/30/18 140 lb " (63.5 kg)   07/02/18 143 lb (64.9 kg)   05/02/18 140 lb (63.5 kg)              Today, you had the following     No orders found for display         Today's Medication Changes          These changes are accurate as of 7/30/18  3:49 PM.  If you have any questions, ask your nurse or doctor.               These medicines have changed or have updated prescriptions.        Dose/Directions    metroNIDAZOLE 500 MG tablet   Commonly known as:  FLAGYL   This may have changed:  Another medication with the same name was removed. Continue taking this medication, and follow the directions you see here.   Changed by:  Kathryn Renteria APRN CNP        As needed for bacterial vaginal infections.   Refills:  0         Stop taking these medicines if you haven't already. Please contact your care team if you have questions.     doxycycline 100 MG tablet   Commonly known as:  VIBRA-TABS   Stopped by:  Kathryn Renteria APRN CNP           paragard intrauterine copper   Stopped by:  Kathryn Renteria APRN CNP                Where to get your medicines      These medications were sent to [place] [address]     Phone:  [phone]     citalopram 10 MG tablet         Some of these will need a paper prescription and others can be bought over the counter.  Ask your nurse if you have questions.     Bring a paper prescription for each of these medications     ALPRAZolam 0.5 MG tablet                Primary Care Provider Office Phone # Fax #    Regino Luciano PA-C [phone]       [address] [place] [address]        Equal Access to Services     [facility] : Hadbenitez veronicao [place], waaxda luqadaha, qaybta kaalmada adeegyada, ca nelson. So [facility] [phone].    ATENCIÓN: Si habla español, tiene a gilbert disposición servicios gratuitos de asistencia lingüística. Llame al  [phone].    We comply with applicable federal civil rights laws and [place] laws. We do not discriminate on the basis of race, color, national origin, age, disability, sex, sexual orientation, or gender identity.            Thank you!     Thank you for choosing [facility]  for your care. Our goal is always to provide you with excellent care. Hearing back from our patients is one way we can continue to improve our services. Please take a few minutes to complete the written survey that you may receive in the mail after your visit with us. Thank you!             Your Updated Medication List - Protect others around you: Learn how to safely use, store and throw away your medicines at www.disposemymeds.org.          This list is accurate as of 7/30/18  3:49 PM.  Always use your most recent med list.                   Brand Name Dispense Instructions for use Diagnosis    ALPRAZolam 0.5 MG tablet    XANAX    90 tablet    Take 1 tablet (0.5 mg) by mouth 3 times daily as needed for anxiety    Anxiety       augmented betamethasone dipropionate 0.05 % cream    DIPROLENE-AF    50 g    Apply to AA BID x 2-3 weeks then PRN    Lichen simplex chronicus       cetirizine 10 MG tablet    zyrTEC     Take 10 mg by mouth daily        citalopram 10 MG tablet    celeXA    90 tablet    Take 1 tablet (10 mg) by mouth daily    Anxiety       lidocaine 2 % topical gel    XYLOCAINE     Apply topically as needed for moderate pain Three time daily as needed        metroNIDAZOLE 500 MG tablet    FLAGYL     As needed for bacterial vaginal infections.        tretinoin 0.1 % cream    RETIN-A    45 g    Apply topically At Bedtime    Other acne       valACYclovir 1000 mg tablet    VALTREX    90 tablet    Take 1 tablet (1,000 mg) by mouth daily    Herpes simplex virus infection       VOLTAREN 1 % Gel topical gel   Generic drug:  diclofenac      As needed for back pain

## 2018-07-30 NOTE — PROGRESS NOTES
SUBJECTIVE:   Anastacia Paez is a 38 year old female who presents to clinic today for the following health issues:    Anxiety Follow-Up    Status since last visit: No change    Other associated symptoms:None    Complicating factors:   Significant life event: No   Current substance abuse: None  Depression symptoms: No  RAJ-7 SCORE 11/21/2017   Total Score 2       RAJ-7  Has been on Xanax for at least 9 years per Anastacia.  States she generally takes 1 at night and occasionally one during the day as needed for anxiety.  Was put on Celexa for postpartum depression and has not been able to get off of this but did not tolerate an increase well.  Happy with current medications and does not wish to make any changes.    Problem list and histories reviewed & adjusted, as indicated.  Additional history: as documented    Patient Active Problem List   Diagnosis     Seasonal allergic rhinitis     Encounter for initial prescription of intrauterine contraceptive device     Panic attack     Bilateral low back pain without sciatica     Dysmenorrhea     Anxiety     Herpes simplex virus infection     Sun-damaged skin     Lichen simplex chronicus     History reviewed. No pertinent surgical history.    Social History   Substance Use Topics     Smoking status: Former Smoker     Quit date: 1/1/2004     Smokeless tobacco: Never Used     Alcohol use Yes      Comment: occasionally     Family History   Problem Relation Age of Onset     HEART DISEASE Father      CHF     Other - See Comments Father      lung transplant due to agent orange     Other - See Comments Daughter      issues with ears         Current Outpatient Prescriptions   Medication Sig Dispense Refill     ALPRAZolam (XANAX) 0.5 MG tablet Take 1 tablet (0.5 mg) by mouth 3 times daily as needed for anxiety 90 tablet 1     augmented betamethasone dipropionate (DIPROLENE-AF) 0.05 % cream Apply to AA BID x 2-3 weeks then PRN 50 g 1     cetirizine (ZYRTEC) 10 MG tablet Take 10 mg by  "mouth daily       citalopram (CELEXA) 10 MG tablet Take 1 tablet (10 mg) by mouth daily 90 tablet 3     diclofenac (VOLTAREN) 1 % GEL As needed for back pain       lidocaine (XYLOCAINE) 2 % jelly Apply topically as needed for moderate pain Three time daily as needed       metroNIDAZOLE (FLAGYL) 500 MG tablet As needed for bacterial vaginal infections.       tretinoin (RETIN-A) 0.1 % cream Apply topically At Bedtime 45 g 1     valACYclovir (VALTREX) 1000 mg tablet Take 1 tablet (1,000 mg) by mouth daily 90 tablet 1     [DISCONTINUED] citalopram (CELEXA) 10 MG tablet Take 1 tablet (10 mg) by mouth daily 90 tablet 3     Allergies   Allergen Reactions     Morphine Itching     Labs reviewed in EPIC    Reviewed and updated as needed this visit by clinical staff       Reviewed and updated as needed this visit by Provider         ROS:  Constitutional, HEENT, cardiovascular, pulmonary, gi and gu systems are negative, except as otherwise noted.    OBJECTIVE:     /62 (Cuff Size: Adult Regular)  Pulse 74  Temp 98.5  F (36.9  C) (Tympanic)  Ht 5' 4.75\" (1.645 m)  Wt 140 lb (63.5 kg)  BMI 23.48 kg/m2  Body mass index is 23.48 kg/(m^2).  GENERAL: healthy, alert and no distress  PSYCH: mentation appears normal, affect normal/bright    Diagnostic Test Results:  none     ASSESSMENT/PLAN:     1. Anxiety  Controlled.  Continue with Celexa.  Rare use of Xanax recommended.  Anastacia verbalized understanding.  - ALPRAZolam (XANAX) 0.5 MG tablet; Take 1 tablet (0.5 mg) by mouth 3 times daily as needed for anxiety  Dispense: 90 tablet; Refill: 1  - citalopram (CELEXA) 10 MG tablet; Take 1 tablet (10 mg) by mouth daily  Dispense: 90 tablet; Refill: 3    Home care instructions were reviewed with the patient. The risks, benefits and treatment options of prescribed medications or other treatments have been discussed with the patient. The patient verbalized their understanding and should call or follow up if no improvement or if they " develop further problems.    JUDI Alberto [facility]

## 2018-07-31 ASSESSMENT — PATIENT HEALTH QUESTIONNAIRE - PHQ9: SUM OF ALL RESPONSES TO PHQ QUESTIONS 1-9: 4

## 2018-07-31 ASSESSMENT — ANXIETY QUESTIONNAIRES: GAD7 TOTAL SCORE: 5

## 2018-08-22 ENCOUNTER — MYC REFILL (OUTPATIENT)
Dept: FAMILY MEDICINE | Facility: CLINIC | Age: 38
End: 2018-08-22

## 2018-08-22 ENCOUNTER — MYC REFILL (OUTPATIENT)
Dept: FAMILY MEDICINE | Facility: OTHER | Age: 38
End: 2018-08-22

## 2018-08-22 DIAGNOSIS — L70.8 OTHER ACNE: ICD-10-CM

## 2018-08-22 DIAGNOSIS — F41.9 ANXIETY: ICD-10-CM

## 2018-08-23 ENCOUNTER — TELEPHONE (OUTPATIENT)
Dept: FAMILY MEDICINE | Facility: CLINIC | Age: 38
End: 2018-08-23

## 2018-08-23 RX ORDER — CITALOPRAM HYDROBROMIDE 10 MG/1
10 TABLET ORAL DAILY
Qty: 90 TABLET | Refills: 3 | Status: SHIPPED | OUTPATIENT
Start: 2018-08-23 | End: 2019-07-11

## 2018-08-23 RX ORDER — TRETINOIN 1 MG/G
CREAM TOPICAL AT BEDTIME
Qty: 45 G | Refills: 1 | Status: ON HOLD | OUTPATIENT
Start: 2018-08-23 | End: 2019-02-01

## 2018-08-23 NOTE — TELEPHONE ENCOUNTER
[facility] Prior Authorization Team Request    Medication: Prior Authorization required for Tretinoin  Dosing:   NDC (required for Medicaid members): [address]-51    Insurance   BIN: [id number]  PCN: [id number]  Grp: [id number]  ID: [id number]    CoverMyMeds Key (if applicable):     Additional documentation:     Filling Pharmacy: [facility]  Phone Number: [phone]  Contact: KASSANDRA PHARM STAN PEDROZA (P [id number]) please send all responses to this pool.  Pharmacy NPI (required for Medicaid members):[id number]

## 2018-08-23 NOTE — TELEPHONE ENCOUNTER
"Requested Prescriptions   Pending Prescriptions Disp Refills     tretinoin (RETIN-A) 0.1 % cream 45 g 1     Sig: Apply topically At Bedtime    Topical Acne Medications Protocol Passed    8/23/2018 10:07 AM       Passed - Patient is 12 years of age or older       Passed - Recent (12 mo) or future (30 days) visit within the authorizing provider's specialty    Patient had office visit in the last 12 months or has a visit in the next 30 days with authorizing provider or within the authorizing provider's specialty.  See \"Patient Info\" tab in inbasket, or \"Choose Columns\" in Meds & Orders section of the refill encounter.            Last ov 07/30/2018  Last filled 02/02/2017    Routing refill request to provider for review/approval because:  A break in medication        "

## 2018-08-23 NOTE — TELEPHONE ENCOUNTER
Message from [employer]:  Original authorizing provider: ANGELA Turner would like a refill of the following medications:  tretinoin (RETIN-A) 0.1 % cream [Regulo Dudley PA-C]    Preferred pharmacy: [address]     Comment:

## 2018-08-24 NOTE — TELEPHONE ENCOUNTER
PA Initiation    Medication: Prior Authorization required for Tretinoin  Insurance Company: [employer] - Phone [phone] Fax [phone]  Pharmacy Filling the Rx: [address]   Filling Pharmacy Phone: [phone]  Filling Pharmacy Fax:    Start Date: 8/24/2018    THIS HAS BEEN SUBMITTED BY THE PRIOR-AUTHORIZATION TEAM. ANY QUESTIONS PLEASE CALL [phone]. THANK YOU

## 2018-08-27 NOTE — TELEPHONE ENCOUNTER
Prior Authorization Approval    Authorization Effective Date: 8/24/2018  Authorization Expiration Date: 8/24/2019  Medication: Prior Authorization required for Tretinoin approved   Approved Dose/Quantity:   Reference #:     Insurance Company: [employer] - scanR [phone] Fax [phone]  Expected CoPay:       CoPay Card Available:      Foundation Assistance Needed:    Which Pharmacy is filling the prescription (Not needed for infusion/clinic administered): [facility] [address]   Pharmacy Notified: Yes  Patient Notified: Yes

## 2018-09-30 ENCOUNTER — MYC REFILL (OUTPATIENT)
Dept: FAMILY MEDICINE | Facility: OTHER | Age: 38
End: 2018-09-30

## 2018-09-30 DIAGNOSIS — B00.9 HERPES SIMPLEX VIRUS INFECTION: ICD-10-CM

## 2018-10-01 NOTE — TELEPHONE ENCOUNTER
Message from MyChart:  Original authorizing provider: ANGELA Turner would like a refill of the following medications:  valACYclovir (VALTREX) 1000 mg tablet [Regulo Dudley PA-C]    Preferred pharmacy: [address]     Comment:

## 2018-10-02 RX ORDER — VALACYCLOVIR HYDROCHLORIDE 1 G/1
1000 TABLET, FILM COATED ORAL DAILY
Qty: 90 TABLET | Refills: 1 | Status: SHIPPED | OUTPATIENT
Start: 2018-10-02 | End: 2021-11-05

## 2018-10-15 ENCOUNTER — MYC REFILL (OUTPATIENT)
Dept: FAMILY MEDICINE | Facility: CLINIC | Age: 38
End: 2018-10-15

## 2018-10-15 DIAGNOSIS — F41.9 ANXIETY: ICD-10-CM

## 2018-10-16 RX ORDER — ALPRAZOLAM 0.5 MG
0.5 TABLET ORAL 3 TIMES DAILY PRN
Qty: 90 TABLET | Refills: 1 | Status: SHIPPED | OUTPATIENT
Start: 2018-10-16 | End: 2019-02-06

## 2018-10-16 NOTE — TELEPHONE ENCOUNTER
RX [place] ([place]) reviewed:  reviewed- no concerns    [place]:  [email or web address]

## 2018-10-16 NOTE — TELEPHONE ENCOUNTER
Controlled Substance Refill Request for Xanax 0.5 mg  Problem List Complete:  Yes   Anxiety  F41.9        checked in past 3 months?  No, route to RN     Last Written Prescription Date:  7/30/18  Last Fill Quantity: 90,  # refills: 1   Last office visit: 7/30/2018 with prescribing provider:  Dexter Gaffney Office Visit:

## 2018-10-16 NOTE — TELEPHONE ENCOUNTER
Message from MyChart:  Original authorizing provider: JUDI Alberto CNP would like a refill of the following medications:  ALPRAZolam (XANAX) 0.5 MG tablet [JUDI Alberto CNP]    Preferred pharmacy: [facility] [place] - [address] [place] [address]    Comment:

## 2018-11-06 ENCOUNTER — OFFICE VISIT (OUTPATIENT)
Dept: FAMILY MEDICINE | Facility: CLINIC | Age: 38
End: 2018-11-06
Payer: COMMERCIAL

## 2018-11-06 VITALS
HEIGHT: 65 IN | SYSTOLIC BLOOD PRESSURE: 110 MMHG | HEART RATE: 64 BPM | BODY MASS INDEX: 24.16 KG/M2 | DIASTOLIC BLOOD PRESSURE: 60 MMHG | WEIGHT: 145 LBS | TEMPERATURE: 98.8 F | RESPIRATION RATE: 16 BRPM

## 2018-11-06 DIAGNOSIS — B96.89 BACTERIAL VAGINOSIS: Primary | ICD-10-CM

## 2018-11-06 DIAGNOSIS — N89.8 VAGINAL DISCHARGE: ICD-10-CM

## 2018-11-06 DIAGNOSIS — N76.0 BACTERIAL VAGINOSIS: Primary | ICD-10-CM

## 2018-11-06 LAB
SPECIMEN SOURCE: ABNORMAL
WET PREP SPEC: ABNORMAL

## 2018-11-06 PROCEDURE — 87210 SMEAR WET MOUNT SALINE/INK: CPT | Performed by: NURSE PRACTITIONER

## 2018-11-06 PROCEDURE — 99213 OFFICE O/P EST LOW 20 MIN: CPT | Performed by: NURSE PRACTITIONER

## 2018-11-06 RX ORDER — METRONIDAZOLE 500 MG/1
500 TABLET ORAL 2 TIMES DAILY
Qty: 14 TABLET | Refills: 0 | Status: SHIPPED | OUTPATIENT
Start: 2018-11-06 | End: 2019-01-28

## 2018-11-06 NOTE — MR AVS SNAPSHOT
After Visit Summary   2018    Anastacia Paez    MRN: [id number]           Patient Information     Date Of Birth          1980        Visit Information        Provider Department      2018 8:00 AM Kathryn Renteria APRN [facility]        Today's Diagnoses     Bacterial vaginosis    -  1    Vaginal discharge          Care Instructions    Flagyl sent to the pharmacy for symptoms    Follow up if symptoms do not improve or worsen.    Bacterial Vaginosis    You have a vaginal infection called bacterial vaginosis (BV). Both good and bad bacteria are present in a healthy vagina. BV occurs when these bacteria get out of balance. The number of bad bacteria increase. And the number of good bacteria decrease. Although BV is associated with sexual activity, it is not a sexually transmitted disease.  BV may or may not cause symptoms. If symptoms do occur, they can include:    Thin, gray, milky-white, or sometimes green discharge    Unpleasant odor or  fishy  smell    Itching, burning, or pain in or around the vagina  It is not known what causes BV, but certain factors can make the problem more likely. This can include:    Douching    Having sex with a new partner    Having sex with more than one partner  BV will sometimes go away on its own. But treatment is usually recommended. This is because untreated BV can increase the risk of more serious health problems such as:    Pelvic inflammatory disease (PID)     delivery (giving birth to a baby early if you re pregnant)    HIV and certain other sexually transmitted diseases (STDs)    Infection after surgery on the reproductive organs  Home care  General care    BV is most often treated with medicines called antibiotics. These may be given as pills or as a vaginal cream. If antibiotics are prescribed, be sure to use them exactly as directed. Also, be sure to complete all of the medicine, even if your symptoms go  away.    Don't douche or having sex during treatment.    If you have sex with a female partner, ask your healthcare provider if she should also be treated.  Prevention    Don't douche.    Don't have sex. If you do have sex, then take steps to lower your risk:  ? Use condoms when having sex.  ? Limit the number of sexual partners you have.  Follow-up care  Follow up with your healthcare provider, or as advised.  When to seek medical advice  Call your healthcare provider right away if:    You have a fever of 100.4 F (38 C) or higher, or as directed by your provider.    Your symptoms worsen, or they don t go away within a few days of starting treatment.    You have new pain in the lower belly or pelvic region.    You have side effects that bother you or a reaction to the pills or cream you re prescribed.    You or any partners you have sex with have new symptoms, such as a rash, joint pain, or sores.  Date Last Reviewed: 10/1/2017    0832-3756 [employer]. [address] [place] [address]. All rights reserved. This information is not intended as a substitute for professional medical care. Always follow your healthcare professional's instructions.                Follow-ups after your visit        Who to contact     If you have questions or need follow up information about today's clinic visit or your schedule please contact [facility] directly at [phone].  Normal or non-critical lab and imaging results will be communicated to you by MyChart, letter or phone within 4 business days after the clinic has received the results. If you do not hear from us within 7 days, please contact the clinic through MyChart or phone. If you have a critical or abnormal lab result, we will notify you by phone as soon as possible.  Submit refill requests through [employer] or call your pharmacy and they will forward the refill request to us. Please allow 3 business days for your refill to be  "completed.          Additional Information About Your Visit        [employer] Information     [email or web address] gives you secure access to your electronic health record. If you see a primary care provider, you can also send messages to your care team and make appointments. If you have questions, please call your primary care clinic.  If you do not have a primary care provider, please call [phone] and they will assist you.        Care EveryWhere ID     This is your Care EveryWhere ID. This could be used by other organizations to access your [facility] medical records  [id number]        Your Vitals Were     Pulse Temperature Respirations Height BMI (Body Mass Index)       64 98.8  F (37.1  C) (Tympanic) 16 5' 4.75\" (1.645 m) 24.32 kg/m2        Blood Pressure from Last 3 Encounters:   11/06/18 110/60   07/30/18 110/62   07/02/18 96/62    Weight from Last 3 Encounters:   11/06/18 145 lb (65.8 kg)   07/30/18 140 lb (63.5 kg)   07/02/18 143 lb (64.9 kg)              We Performed the Following     Wet prep          Today's Medication Changes          These changes are accurate as of 11/6/18  8:36 AM.  If you have any questions, ask your nurse or doctor.               These medicines have changed or have updated prescriptions.        Dose/Directions    * metroNIDAZOLE 500 MG tablet   Commonly known as:  FLAGYL   This may have changed:  Another medication with the same name was added. Make sure you understand how and when to take each.   Changed by:  Kathryn Renteria APRN CNP        As needed for bacterial vaginal infections.   Refills:  0       * metroNIDAZOLE 500 MG tablet   Commonly known as:  FLAGYL   This may have changed:  You were already taking a medication with the same name, and this prescription was added. Make sure you understand how and when to take each.   Used for:  Bacterial vaginosis   Changed by:  Kathryn Renteria APRN CNP        Dose:  500 mg   Take 1 tablet (500 mg) by mouth 2 times daily   Quantity:  " 14 tablet   Refills:  0       * Notice:  This list has 2 medication(s) that are the same as other medications prescribed for you. Read the directions carefully, and ask your doctor or other care provider to review them with you.         Where to get your medicines      These medications were sent to [facility] - [address] [address]     Phone:  [phone]     metroNIDAZOLE 500 MG tablet                Primary Care Provider Office Phone # Fax #    Kathryn RenteriaJUDI [phone]       [address] [place] [address]        Equal Access to Services     [facility] : Hadii karen stallings hadasho Sofelixali, waaxda luqadaha, qaybta kaalmada adebraulioyada, ca boswell . So [facility] [phone].    ATENCIÓN: Si habla español, tiene a gilbert disposición servicios gratuitos de asistencia lingüística. Llame al [phone].    We comply with applicable federal civil rights laws and [place] laws. We do not discriminate on the basis of race, color, national origin, age, disability, sex, sexual orientation, or gender identity.            Thank you!     Thank you for choosing [facility]  for your care. Our goal is always to provide you with excellent care. Hearing back from our patients is one way we can continue to improve our services. Please take a few minutes to complete the written survey that you may receive in the mail after your visit with us. Thank you!             Your Updated Medication List - Protect others around you: Learn how to safely use, store and throw away your medicines at www.disposemymeds.org.          This list is accurate as of 11/6/18  8:36 AM.  Always use your most recent med list.                   Brand Name Dispense Instructions for use Diagnosis    ALPRAZolam 0.5 MG tablet    XANAX    90 tablet    Take 1 tablet (0.5 mg) by mouth 3 times daily as needed for  anxiety    Anxiety       augmented betamethasone dipropionate 0.05 % cream    DIPROLENE-AF    50 g    Apply to AA BID x 2-3 weeks then PRN    Lichen simplex chronicus       cetirizine 10 MG tablet    zyrTEC     Take 10 mg by mouth daily        citalopram 10 MG tablet    celeXA    90 tablet    Take 1 tablet (10 mg) by mouth daily    Anxiety       lidocaine 2 % topical gel    XYLOCAINE     Apply topically as needed for moderate pain Three time daily as needed        * metroNIDAZOLE 500 MG tablet    FLAGYL     As needed for bacterial vaginal infections.        * metroNIDAZOLE 500 MG tablet    FLAGYL    14 tablet    Take 1 tablet (500 mg) by mouth 2 times daily    Bacterial vaginosis       tretinoin 0.1 % cream    RETIN-A    45 g    Apply topically At Bedtime    Other acne       valACYclovir 1000 mg tablet    VALTREX    90 tablet    Take 1 tablet (1,000 mg) by mouth daily    Herpes simplex virus infection       VOLTAREN 1 % Gel topical gel   Generic drug:  diclofenac      As needed for back pain        * Notice:  This list has 2 medication(s) that are the same as other medications prescribed for you. Read the directions carefully, and ask your doctor or other care provider to review them with you.

## 2018-11-06 NOTE — PATIENT INSTRUCTIONS
Flagyl sent to the pharmacy for symptoms    Follow up if symptoms do not improve or worsen.    Bacterial Vaginosis    You have a vaginal infection called bacterial vaginosis (BV). Both good and bad bacteria are present in a healthy vagina. BV occurs when these bacteria get out of balance. The number of bad bacteria increase. And the number of good bacteria decrease. Although BV is associated with sexual activity, it is not a sexually transmitted disease.  BV may or may not cause symptoms. If symptoms do occur, they can include:    Thin, gray, milky-white, or sometimes green discharge    Unpleasant odor or  fishy  smell    Itching, burning, or pain in or around the vagina  It is not known what causes BV, but certain factors can make the problem more likely. This can include:    Douching    Having sex with a new partner    Having sex with more than one partner  BV will sometimes go away on its own. But treatment is usually recommended. This is because untreated BV can increase the risk of more serious health problems such as:    Pelvic inflammatory disease (PID)     delivery (giving birth to a baby early if you re pregnant)    HIV and certain other sexually transmitted diseases (STDs)    Infection after surgery on the reproductive organs  Home care  General care    BV is most often treated with medicines called antibiotics. These may be given as pills or as a vaginal cream. If antibiotics are prescribed, be sure to use them exactly as directed. Also, be sure to complete all of the medicine, even if your symptoms go away.    Don't douche or having sex during treatment.    If you have sex with a female partner, ask your healthcare provider if she should also be treated.  Prevention    Don't douche.    Don't have sex. If you do have sex, then take steps to lower your risk:  ? Use condoms when having sex.  ? Limit the number of sexual partners you have.  Follow-up care  Follow up with your healthcare provider, or  as advised.  When to seek medical advice  Call your healthcare provider right away if:    You have a fever of 100.4 F (38 C) or higher, or as directed by your provider.    Your symptoms worsen, or they don t go away within a few days of starting treatment.    You have new pain in the lower belly or pelvic region.    You have side effects that bother you or a reaction to the pills or cream you re prescribed.    You or any partners you have sex with have new symptoms, such as a rash, joint pain, or sores.  Date Last Reviewed: 10/1/2017    7385-8011 [employer]. [address] [place] [address], [place] [address]. All rights reserved. This information is not intended as a substitute for professional medical care. Always follow your healthcare professional's instructions.

## 2018-11-06 NOTE — PROGRESS NOTES
SUBJECTIVE:   Anastacia Paez is a 38 year old female who presents to clinic today for the following health issues:    Vaginal Symptoms      Duration: 1.5 weeks     Description  Mild burning    Intensity:  mild    Accompanying signs and symptoms (fever/dysuria/abdominal or back pain): None    History  Sexually active: yes, single partner, contraception - vasectomy  Possibility of pregnancy: No  Recent antibiotic use: no     Precipitating or alleviating factors: None    Therapies tried and outcome: none   Outcome: none     Problem list and histories reviewed & adjusted, as indicated.  Additional history: as documented    Patient Active Problem List   Diagnosis     Seasonal allergic rhinitis     Encounter for initial prescription of intrauterine contraceptive device     Panic attack     Bilateral low back pain without sciatica     Dysmenorrhea     Anxiety     Herpes simplex virus infection     Sun-damaged skin     Lichen simplex chronicus     History reviewed. No pertinent surgical history.    Social History   Substance Use Topics     Smoking status: Former Smoker     Quit date: 1/1/2004     Smokeless tobacco: Never Used     Alcohol use Yes      Comment: occasionally     Family History   Problem Relation Age of Onset     HEART DISEASE Father      CHF     Other - See Comments Father      lung transplant due to agent orange     Other - See Comments Daughter      issues with ears         Current Outpatient Prescriptions   Medication Sig Dispense Refill     ALPRAZolam (XANAX) 0.5 MG tablet Take 1 tablet (0.5 mg) by mouth 3 times daily as needed for anxiety 90 tablet 1     augmented betamethasone dipropionate (DIPROLENE-AF) 0.05 % cream Apply to AA BID x 2-3 weeks then PRN 50 g 1     cetirizine (ZYRTEC) 10 MG tablet Take 10 mg by mouth daily       citalopram (CELEXA) 10 MG tablet Take 1 tablet (10 mg) by mouth daily 90 tablet 3     diclofenac (VOLTAREN) 1 % GEL As needed for back pain       lidocaine (XYLOCAINE) 2 % jelly  "Apply topically as needed for moderate pain Three time daily as needed       tretinoin (RETIN-A) 0.1 % cream Apply topically At Bedtime 45 g 1     valACYclovir (VALTREX) 1000 mg tablet Take 1 tablet (1,000 mg) by mouth daily 90 tablet 1     metroNIDAZOLE (FLAGYL) 500 MG tablet As needed for bacterial vaginal infections.       Allergies   Allergen Reactions     Morphine Itching     Labs reviewed in EPIC    Reviewed and updated as needed this visit by clinical staff       Reviewed and updated as needed this visit by Provider         ROS:  Constitutional, HEENT, cardiovascular, pulmonary, gi and gu systems are negative, except as otherwise noted.    OBJECTIVE:     /60 (Cuff Size: Adult Regular)  Pulse 64  Temp 98.8  F (37.1  C) (Tympanic)  Resp 16  Ht 5' 4.75\" (1.645 m)  Wt 145 lb (65.8 kg)  BMI 24.32 kg/m2  Body mass index is 24.32 kg/(m^2).  GENERAL: healthy, alert and no distress  PSYCH: mentation appears normal, affect normal/bright    Diagnostic Test Results:  Results for orders placed or performed in visit on 11/06/18 (from the past 24 hour(s))   Wet prep   Result Value Ref Range    Specimen Description Vagina     Wet Prep No yeast seen     Wet Prep Clue cells seen (A)     Wet Prep No Trichomonas seen        ASSESSMENT/PLAN:     1. Bacterial vaginosis  Flagyl sent to the pharmacy for symptoms.  Symptomatic care and follow up discussed.  - metroNIDAZOLE (FLAGYL) 500 MG tablet; Take 1 tablet (500 mg) by mouth 2 times daily  Dispense: 14 tablet; Refill: 0    2. Vaginal discharge    - Wet prep    Home care instructions were reviewed with the patient. The risks, benefits and treatment options of prescribed medications or other treatments have been discussed with the patient. The patient verbalized their understanding and should call or follow up if no improvement or if they develop further problems.    Patient Instructions   Flagyl sent to the pharmacy for symptoms    Follow up if symptoms do not improve " or worsen.    Bacterial Vaginosis    You have a vaginal infection called bacterial vaginosis (BV). Both good and bad bacteria are present in a healthy vagina. BV occurs when these bacteria get out of balance. The number of bad bacteria increase. And the number of good bacteria decrease. Although BV is associated with sexual activity, it is not a sexually transmitted disease.  BV may or may not cause symptoms. If symptoms do occur, they can include:    Thin, gray, milky-white, or sometimes green discharge    Unpleasant odor or  fishy  smell    Itching, burning, or pain in or around the vagina  It is not known what causes BV, but certain factors can make the problem more likely. This can include:    Douching    Having sex with a new partner    Having sex with more than one partner  BV will sometimes go away on its own. But treatment is usually recommended. This is because untreated BV can increase the risk of more serious health problems such as:    Pelvic inflammatory disease (PID)     delivery (giving birth to a baby early if you re pregnant)    HIV and certain other sexually transmitted diseases (STDs)    Infection after surgery on the reproductive organs  Home care  General care    BV is most often treated with medicines called antibiotics. These may be given as pills or as a vaginal cream. If antibiotics are prescribed, be sure to use them exactly as directed. Also, be sure to complete all of the medicine, even if your symptoms go away.    Don't douche or having sex during treatment.    If you have sex with a female partner, ask your healthcare provider if she should also be treated.  Prevention    Don't douche.    Don't have sex. If you do have sex, then take steps to lower your risk:  ? Use condoms when having sex.  ? Limit the number of sexual partners you have.  Follow-up care  Follow up with your healthcare provider, or as advised.  When to seek medical advice  Call your healthcare provider right away  if:    You have a fever of 100.4 F (38 C) or higher, or as directed by your provider.    Your symptoms worsen, or they don t go away within a few days of starting treatment.    You have new pain in the lower belly or pelvic region.    You have side effects that bother you or a reaction to the pills or cream you re prescribed.    You or any partners you have sex with have new symptoms, such as a rash, joint pain, or sores.  Date Last Reviewed: 10/1/2017    5509-1575 [employer]. [address] [place] [address]. All rights reserved. This information is not intended as a substitute for professional medical care. Always follow your healthcare professional's instructions.            JUDI Alberto [facility]

## 2018-11-06 NOTE — NURSING NOTE
"Chief Complaint   Patient presents with     Vaginal Problem       Initial /60 (Cuff Size: Adult Regular)  Pulse 64  Temp 98.8  F (37.1  C) (Tympanic)  Resp 16  Ht 5' 4.75\" (1.645 m)  Wt 145 lb (65.8 kg)  BMI 24.32 kg/m2 Estimated body mass index is 24.32 kg/(m^2) as calculated from the following:    Height as of this encounter: 5' 4.75\" (1.645 m).    Weight as of this encounter: 145 lb (65.8 kg).    Patient presents to the clinic using No DME    Health Maintenance that is potentially due pending provider review:  NONE    n/a    Hui Kuhn, CMA        "

## 2018-11-12 ENCOUNTER — MYC MEDICAL ADVICE (OUTPATIENT)
Dept: FAMILY MEDICINE | Facility: CLINIC | Age: 38
End: 2018-11-12

## 2018-11-12 DIAGNOSIS — B96.89 BV (BACTERIAL VAGINOSIS): Primary | ICD-10-CM

## 2018-11-12 DIAGNOSIS — N76.0 BV (BACTERIAL VAGINOSIS): Primary | ICD-10-CM

## 2018-11-12 RX ORDER — CLINDAMYCIN HCL 300 MG
300 CAPSULE ORAL 2 TIMES DAILY
Qty: 14 CAPSULE | Refills: 0 | Status: ON HOLD | OUTPATIENT
Start: 2018-11-12 | End: 2019-02-01

## 2018-12-02 ENCOUNTER — MYC MEDICAL ADVICE (OUTPATIENT)
Dept: FAMILY MEDICINE | Facility: CLINIC | Age: 38
End: 2018-12-02

## 2018-12-02 DIAGNOSIS — N72 CERVICITIS: Primary | ICD-10-CM

## 2018-12-03 RX ORDER — AZITHROMYCIN 500 MG/1
1000 TABLET, FILM COATED ORAL ONCE
Qty: 2 TABLET | Refills: 0 | Status: ON HOLD | OUTPATIENT
Start: 2018-12-03 | End: 2019-02-01

## 2018-12-24 ENCOUNTER — MYC MEDICAL ADVICE (OUTPATIENT)
Dept: FAMILY MEDICINE | Facility: CLINIC | Age: 38
End: 2018-12-24

## 2018-12-24 DIAGNOSIS — Z87.42 HISTORY OF VAGINAL INFECTION: Primary | ICD-10-CM

## 2018-12-24 RX ORDER — AZITHROMYCIN 500 MG/1
1000 TABLET, FILM COATED ORAL ONCE
Qty: 2 TABLET | Refills: 0 | Status: ON HOLD | OUTPATIENT
Start: 2018-12-24 | End: 2019-02-01

## 2019-01-02 ENCOUNTER — OFFICE VISIT (OUTPATIENT)
Dept: OBGYN | Facility: CLINIC | Age: 39
End: 2019-01-02
Payer: COMMERCIAL

## 2019-01-02 VITALS
BODY MASS INDEX: 24.32 KG/M2 | SYSTOLIC BLOOD PRESSURE: 104 MMHG | WEIGHT: 145 LBS | DIASTOLIC BLOOD PRESSURE: 64 MMHG | HEART RATE: 68 BPM

## 2019-01-02 DIAGNOSIS — N39.46 MIXED STRESS AND URGE URINARY INCONTINENCE: ICD-10-CM

## 2019-01-02 DIAGNOSIS — R39.89 BLADDER PAIN: ICD-10-CM

## 2019-01-02 DIAGNOSIS — R10.2 VAGINAL PAIN: Primary | ICD-10-CM

## 2019-01-02 DIAGNOSIS — N93.9 ABNORMAL UTERINE BLEEDING (AUB): ICD-10-CM

## 2019-01-02 LAB
ALBUMIN UR-MCNC: NEGATIVE MG/DL
APPEARANCE UR: CLEAR
BILIRUB UR QL STRIP: NEGATIVE
COLOR UR AUTO: NORMAL
GLUCOSE UR STRIP-MCNC: NEGATIVE MG/DL
HGB UR QL STRIP: NEGATIVE
KETONES UR STRIP-MCNC: NEGATIVE MG/DL
LEUKOCYTE ESTERASE UR QL STRIP: NEGATIVE
NITRATE UR QL: NEGATIVE
PH UR STRIP: 6 PH (ref 5–7)
SOURCE: NORMAL
SP GR UR STRIP: 1 (ref 1–1.03)
SPECIMEN SOURCE: NORMAL
UROBILINOGEN UR STRIP-MCNC: 0 MG/DL (ref 0–2)
WET PREP SPEC: NORMAL

## 2019-01-02 PROCEDURE — 87210 SMEAR WET MOUNT SALINE/INK: CPT | Performed by: OBSTETRICS & GYNECOLOGY

## 2019-01-02 PROCEDURE — 81003 URINALYSIS AUTO W/O SCOPE: CPT | Performed by: OBSTETRICS & GYNECOLOGY

## 2019-01-02 PROCEDURE — 99204 OFFICE O/P NEW MOD 45 MIN: CPT | Performed by: OBSTETRICS & GYNECOLOGY

## 2019-01-02 SDOH — HEALTH STABILITY: MENTAL HEALTH: HOW OFTEN DO YOU HAVE A DRINK CONTAINING ALCOHOL?: MONTHLY OR LESS

## 2019-01-02 NOTE — PROGRESS NOTES
"Clinic Note     HPI:  Anastacia Paez is a 38 year old  who presents for recurrent vaginal infections since . Multiple treatments for BV and Ureaplasma including flagyl, Azithro, and Doxy. She has also had repeat negative GC/Chlam swabs and no concern for STIs. She has had the same male sexual partner for the past few years, reports new intermittent deep dyspareunia since this summer, none prior with same partner. She also senses that she has focal urethral pain and well as deep pelvic pain. Her periods have been increasingly heavy and painful, with bleeding lasting 8-10 days, soaking a super tampon each hour with heaviest bleeding. She recently had a ParaGard removed, was near the 10 year zeus and partner now has a vasectomy. She states that hormones make her \"psycotic,\" will not consider using them again. ROS also notable for positive urge and stress incontinence.     Ob Hx:  s/p  x3  Gyn Hx: Patient's last menstrual period was 2018 (approximate).  Menses are as above.    Last pap NILM, HPV- ()   STI history: oral HSV     PMH:   Past Medical History:   Diagnosis Date     Abnormal Pap smear of cervix 2014    See problem list     Adjustment disorder with anxious mood 2015     Adjustment disorder with mixed anxiety and depressed mood 2015     Anxiety 2015     Bilateral low back pain without sciatica 2015     Dysmenorrhea 2015     Encounter for initial prescription of intrauterine contraceptive device 2015     Herpes simplex virus infection 2017    perioral      Lichen simplex chronicus 10/19/2017     Panic attack 2015     Psoriasis      Seasonal allergic rhinitis 2015     Sun-damaged skin 10/19/2017     SurgHx:   Past Surgical History:   Procedure Laterality Date     ABDOMINOPLASTY  2014     APPENDECTOMY       C BREAST AUGMENTATION       FamHx:   Family History   Problem Relation Age of Onset     Heart Disease Father         " CHF     Other - See Comments Father         lung transplant due to agent orange     Other - See Comments Daughter         issues with ears     SocHx:   Social History     Socioeconomic History     Marital status: Single     Spouse name: None     Number of children: None     Years of education: None     Highest education level: None   Social Needs     Financial resource strain: None     Food insecurity - worry: None     Food insecurity - inability: None     Transportation needs - medical: None     Transportation needs - non-medical: None   Occupational History     None   Tobacco Use     Smoking status: Former Smoker     Last attempt to quit: 1/1/2004     Years since quitting: 15.0     Smokeless tobacco: Never Used   Substance and Sexual Activity     Alcohol use: Yes     Frequency: Monthly or less     Comment: occasionally     Drug use: No     Sexual activity: Yes     Partners: Male     Birth control/protection: Male Surgical   Other Topics Concern     Parent/sibling w/ CABG, MI or angioplasty before 65F 55M? No   Social History Narrative     None     Allergies:   Morphine    Current Medications:   Current Outpatient Medications   Medication Sig Dispense Refill     ALPRAZolam (XANAX) 0.5 MG tablet Take 1 tablet (0.5 mg) by mouth 3 times daily as needed for anxiety 90 tablet 1     augmented betamethasone dipropionate (DIPROLENE-AF) 0.05 % cream Apply to AA BID x 2-3 weeks then PRN 50 g 1     cetirizine (ZYRTEC) 10 MG tablet Take 10 mg by mouth daily       citalopram (CELEXA) 10 MG tablet Take 1 tablet (10 mg) by mouth daily 90 tablet 3     diclofenac (VOLTAREN) 1 % GEL As needed for back pain       lidocaine (XYLOCAINE) 2 % jelly Apply topically as needed for moderate pain Three time daily as needed       tretinoin (RETIN-A) 0.1 % cream Apply topically At Bedtime 45 g 1     valACYclovir (VALTREX) 1000 mg tablet Take 1 tablet (1,000 mg) by mouth daily 90 tablet 1     metroNIDAZOLE (FLAGYL) 500 MG tablet Take 1 tablet  (500 mg) by mouth 2 times daily (Patient not taking: Reported on 1/2/2019) 14 tablet 0     ROS: As described in HPI, otherwise negative for fever/chills, fatigue, dizziness, changes or new deficits in vision, weight changes, worrisome rashes, new lumps or masses, cough/SOB/CP, nausea/vomit, GI distress, dysuria, abnormal vaginal discharge, constipation/diarrhea, neurological deficits, changes in ADL, changes in skin or hair, heat or cold intolerance, or other systemic complaints    EXAM:  Vitals:    01/02/19 1505   BP: 104/64   BP Location: Right arm   Cuff Size: Adult Regular   Pulse: 68   Weight: 65.8 kg (145 lb)    Body mass index is 24.32 kg/m .    Gen: Alert, oriented, appropriately interactive, NAD  Neck: soft, no cervical adenopathy, no masses  CV: RRR, 2+ peripheral pulses  Resp: CTAB, good effort without distress   Breasts: implants present, no axillary adenopathy, no dominant masses, no skin changes, no nipple discharge, nontender  Abdomen: soft, non tender, non distended, no masses, no hernias. No inguinal lymphadenopathy. No hepatosplenomegaly  Perineum: no lesions; normal appearing external genitalia, bartholins glands, urethra, skenes glands, genitalia with prior laser hair removal   Vagina : no masses or lesions or discharge, normally rugated.  Cervix: no masses or lesions or discharge   Bimanual exam: Enlarged uterus with smooth mobile masses appx 5cm left of fundus as well as right and superior to fundus, uterus and masses are smooth, mobile, free of nodularity, and free of obvious adhesions.   Urethra tender   Bladder- tender and without massess nor nodularity   Adnexa - no masses or tenderness   Lower extremities: non-tender, no edema  Skin: no lesions or rashes    Labs & Imaging:  Results for orders placed or performed in visit on 01/02/19 (from the past 24 hour(s))   *UA reflex to Microscopic and Culture ([place]; [facility]; [facility]; [facility]; [facility]; [facility];  [place]; [place])   Result Value Ref [place]    Color Urine Straw     Appearance Urine Clear     Glucose Urine Negative NEG^Negative mg/dL    Bilirubin Urine Negative NEG^Negative    Ketones Urine Negative NEG^Negative mg/dL    Specific Gravity Urine 1.003 1.003 - 1.035    Blood Urine Negative NEG^Negative    pH Urine 6.0 5.0 - 7.0 pH    Protein Albumin Urine Negative NEG^Negative mg/dL    Urobilinogen mg/dL 0.0 0.0 - 2.0 mg/dL    Nitrite Urine Negative NEG^Negative    Leukocyte Esterase Urine Negative NEG^Negative    Source Unspecified Urine    Wet prep   Result Value Ref [place]    Specimen Description Vagina     Wet Prep No Trichomonas seen     Wet Prep No clue cells seen     Wet Prep No yeast seen     Wet Prep Few  PMNs seen          Lab Results   Component Value Date    PAP NIL 07/02/2018       ASSESSMENT/PLAN: Anastacia Paez is a 38 year old  who presents for recurrent vaginal infections since June, as well as new dyspareunia, bladder and urethra pain, and mixed incontinence.     1. Vaginal pain, recurrent BV  - Normal vaginal exam today with normal and non tender vaginal mucosa, no abnl discharge. Normal wet prep and UA today. Prior BV has been adequately treated, and prior clue cells non specific. I suspect symptoms are related to urethral/bladder pain as well as enlarged uterus (possibly unrelated problems) and not due to any vaginal infection.   - We did review vulvar and vaginal hygiene, she is already practicing these   - *UA reflex to Microscopic and Culture ([place]; [facility]; [facility]; [facility]; [facility]; [facility]; [place]; [place])  - Wet prep  - UROLOGY ADULT REFERRAL  - US Pelvic Complete w Transvaginal; Future  - CBC  - HIV    2. AUB  - enlarged uterus, based on exam I suspect multiple pedunculated fibroids, less likely bilateral large ovarian cysts given proximity to uterus as well as mild to moderate tenderness rather than severe. I suspect this is also associated  with her worsening heavy painful periods as well as new dyspareunia.   - We discussed options, she does not tolerate hormones, recommendation would by hysterectomy, ablation unlikely to be effective with large fibroids, also pt young for an ablation. She is interested in a hysterectomy, plan CBC and pelvic Ultrasound, will review results and discuss surgery further.    3. Urinary symptoms  - Focal urethral and bladder pain on exam without mass nor nodularity, normal UA today. Remainder of vagina and pelvic floor non tender on exam. Will refer to urology.   - Reports mixed stress and urge incontinence, to be discussed with urology as well    Richie King MD  1/2/2019 7:42 PM

## 2019-01-03 ENCOUNTER — TELEPHONE (OUTPATIENT)
Dept: FAMILY MEDICINE | Facility: OTHER | Age: 39
End: 2019-01-03

## 2019-01-03 NOTE — TELEPHONE ENCOUNTER
----- Message from Richie King MD sent at 1/2/2019  8:01 PM CST -----  Alberto Frye, please have Anastacia stop by lab when she comes in for her Ultrasound for a CBC (given recent heavy periods) and an HIV screen (routine.) thanks!

## 2019-01-07 ENCOUNTER — HOSPITAL ENCOUNTER (OUTPATIENT)
Dept: ULTRASOUND IMAGING | Facility: CLINIC | Age: 39
Discharge: HOME | End: 2019-01-07
Attending: OBSTETRICS & GYNECOLOGY | Admitting: OBSTETRICS & GYNECOLOGY
Payer: COMMERCIAL

## 2019-01-07 DIAGNOSIS — R10.2 VAGINAL PAIN: ICD-10-CM

## 2019-01-07 DIAGNOSIS — N93.9 ABNORMAL UTERINE BLEEDING (AUB): ICD-10-CM

## 2019-01-07 LAB
ERYTHROCYTE [DISTWIDTH] IN BLOOD BY AUTOMATED COUNT: 11.5 % (ref 10–15)
HCT VFR BLD AUTO: 37.9 % (ref 35–47)
HGB BLD-MCNC: 13.1 G/DL (ref 11.7–15.7)
MCH RBC QN AUTO: 31.7 PG (ref 26.5–33)
MCHC RBC AUTO-ENTMCNC: 34.6 G/DL (ref 31.5–36.5)
MCV RBC AUTO: 92 FL (ref 78–100)
PLATELET # BLD AUTO: 229 10E9/L (ref 150–450)
RBC # BLD AUTO: 4.13 10E12/L (ref 3.8–5.2)
WBC # BLD AUTO: 5.4 10E9/L (ref 4–11)

## 2019-01-07 PROCEDURE — 36415 COLL VENOUS BLD VENIPUNCTURE: CPT | Performed by: OBSTETRICS & GYNECOLOGY

## 2019-01-07 PROCEDURE — 85027 COMPLETE CBC AUTOMATED: CPT | Performed by: OBSTETRICS & GYNECOLOGY

## 2019-01-07 PROCEDURE — 76830 TRANSVAGINAL US NON-OB: CPT

## 2019-01-07 PROCEDURE — 87389 HIV-1 AG W/HIV-1&-2 AB AG IA: CPT | Performed by: OBSTETRICS & GYNECOLOGY

## 2019-01-08 ENCOUNTER — TELEPHONE (OUTPATIENT)
Dept: OBGYN | Facility: CLINIC | Age: 39
End: 2019-01-08

## 2019-01-08 LAB — HIV 1+2 AB+HIV1 P24 AG SERPL QL IA: NONREACTIVE

## 2019-01-08 NOTE — TELEPHONE ENCOUNTER
Left message with normal labs results, normal ultrasound, asked to return to clinic to review options for her symptoms.

## 2019-01-09 ENCOUNTER — OFFICE VISIT (OUTPATIENT)
Dept: OBGYN | Facility: CLINIC | Age: 39
End: 2019-01-09
Payer: COMMERCIAL

## 2019-01-09 VITALS — SYSTOLIC BLOOD PRESSURE: 116 MMHG | DIASTOLIC BLOOD PRESSURE: 74 MMHG | HEART RATE: 80 BPM

## 2019-01-09 DIAGNOSIS — N89.8 VAGINAL DISCHARGE: Primary | ICD-10-CM

## 2019-01-09 LAB
CHOLEST SERPL-MCNC: 202 MG/DL
ESTRADIOL SERPL-MCNC: 57 PG/ML
FSH SERPL-ACNC: 9.6 IU/L
HBA1C MFR BLD: 4.6 % (ref 0–5.6)
HDLC SERPL-MCNC: 69 MG/DL
LDLC SERPL CALC-MCNC: 121 MG/DL
LH SERPL-ACNC: 8.8 IU/L
NONHDLC SERPL-MCNC: 133 MG/DL
TRIGL SERPL-MCNC: 60 MG/DL

## 2019-01-09 PROCEDURE — 83002 ASSAY OF GONADOTROPIN (LH): CPT | Performed by: OBSTETRICS & GYNECOLOGY

## 2019-01-09 PROCEDURE — 99214 OFFICE O/P EST MOD 30 MIN: CPT | Performed by: OBSTETRICS & GYNECOLOGY

## 2019-01-09 PROCEDURE — 82670 ASSAY OF TOTAL ESTRADIOL: CPT | Performed by: OBSTETRICS & GYNECOLOGY

## 2019-01-09 PROCEDURE — 87109 MYCOPLASMA: CPT | Performed by: OBSTETRICS & GYNECOLOGY

## 2019-01-09 PROCEDURE — 84403 ASSAY OF TOTAL TESTOSTERONE: CPT | Performed by: OBSTETRICS & GYNECOLOGY

## 2019-01-09 PROCEDURE — 83001 ASSAY OF GONADOTROPIN (FSH): CPT | Performed by: OBSTETRICS & GYNECOLOGY

## 2019-01-09 PROCEDURE — 36415 COLL VENOUS BLD VENIPUNCTURE: CPT | Performed by: OBSTETRICS & GYNECOLOGY

## 2019-01-09 PROCEDURE — 80061 LIPID PANEL: CPT | Performed by: OBSTETRICS & GYNECOLOGY

## 2019-01-09 PROCEDURE — 83036 HEMOGLOBIN GLYCOSYLATED A1C: CPT | Performed by: OBSTETRICS & GYNECOLOGY

## 2019-01-09 PROCEDURE — 84270 ASSAY OF SEX HORMONE GLOBUL: CPT | Performed by: OBSTETRICS & GYNECOLOGY

## 2019-01-10 ENCOUNTER — TRANSFERRED RECORDS (OUTPATIENT)
Dept: HEALTH INFORMATION MANAGEMENT | Facility: CLINIC | Age: 39
End: 2019-01-10

## 2019-01-10 NOTE — PROGRESS NOTES
"Clinic Note    HPI:   Anastacia Paez is a 38 year old  who presents for follow up regarding recent visit for recurrent vaginal infections, new intermittent deep dyspareunia, and focal urethral pain and well as deep pelvic pain. (Seen on 19)  She is also concerned for periods that have been increasingly heavy and painful, with bleeding lasting 8-10 days, soaking a super tampon each hour with heaviest bleeding. She states that hormones make her \"psycotic,\" will not consider using them again. She is concerned that her hormones are unbalanced. ROS also notable for positive urge and stress incontinence. No new symptoms since last visit.      Ob Hx:  s/p  x3  Gyn Hx: Patient's last menstrual period was 2019.  Menses are as above.                  Last pap NILM, HPV- ()                 STI history: oral HSV    PMH:   Past Medical History:   Diagnosis Date     Abnormal Pap smear of cervix 2014    See problem list     Adjustment disorder with anxious mood 2015     Adjustment disorder with mixed anxiety and depressed mood 2015     Anxiety 2015     Bilateral low back pain without sciatica 2015     Dysmenorrhea 2015     Encounter for initial prescription of intrauterine contraceptive device 2015     Herpes simplex virus infection 2017    perioral      Panic attack 2015     Psoriasis      Seasonal allergic rhinitis 2015     Sun-damaged skin 10/19/2017     SurgHx:   Past Surgical History:   Procedure Laterality Date     ABDOMINOPLASTY       APPENDECTOMY  1992     C BREAST AUGMENTATION       FamHx:   Family History   Problem Relation Age of Onset     Heart Disease Father         CHF     Other - See Comments Father         lung transplant due to agent orange     Other - See Comments Daughter         issues with ears     SocHx:   Social History     Socioeconomic History     Marital status: Single     Spouse name: None     Number of children: " None     Years of education: None     Highest education level: None   Social Needs     Financial resource strain: None     Food insecurity - worry: None     Food insecurity - inability: None     Transportation needs - medical: None     Transportation needs - non-medical: None   Occupational History     None   Tobacco Use     Smoking status: Former Smoker     Last attempt to quit: 1/1/2004     Years since quitting: 15.0     Smokeless tobacco: Never Used   Substance and Sexual Activity     Alcohol use: Yes     Frequency: Monthly or less     Comment: occasionally     Drug use: No     Sexual activity: Yes     Partners: Male     Birth control/protection: Male Surgical   Other Topics Concern     Parent/sibling w/ CABG, MI or angioplasty before 65F 55M? No   Social History Narrative     None     Allergies:   Morphine    Current Medications:   Current Outpatient Medications   Medication Sig Dispense Refill     ALPRAZolam (XANAX) 0.5 MG tablet Take 1 tablet (0.5 mg) by mouth 3 times daily as needed for anxiety 90 tablet 1     augmented betamethasone dipropionate (DIPROLENE-AF) 0.05 % cream Apply to AA BID x 2-3 weeks then PRN 50 g 1     cetirizine (ZYRTEC) 10 MG tablet Take 10 mg by mouth daily       citalopram (CELEXA) 10 MG tablet Take 1 tablet (10 mg) by mouth daily 90 tablet 3     diclofenac (VOLTAREN) 1 % GEL As needed for back pain       lidocaine (XYLOCAINE) 2 % jelly Apply topically as needed for moderate pain Three time daily as needed       tretinoin (RETIN-A) 0.1 % cream Apply topically At Bedtime 45 g 1     valACYclovir (VALTREX) 1000 mg tablet Take 1 tablet (1,000 mg) by mouth daily 90 tablet 1     metroNIDAZOLE (FLAGYL) 500 MG tablet Take 1 tablet (500 mg) by mouth 2 times daily (Patient not taking: Reported on 1/2/2019) 14 tablet 0     ROS: As described in HPI, otherwise negative for fever/chills, fatigue, dizziness, changes or new deficits in vision, weight changes, worrisome rashes, new lumps or masses,  cough/SOB/CP, nausea/vomit, GI distress, constipation/diarrhea, neurological deficits, changes in ADL, changes in skin or hair, heat or cold intolerance, or other systemic complaints    EXAM:  Vitals:    01/09/19 1518   BP: 116/74   BP Location: Right arm   Cuff Size: Adult Regular   Pulse: 80    There is no height or weight on file to calculate BMI.    Gen: Alert, oriented, appropriately interactive, NAD  Neck: soft, no cervical adenopathy, no masses  CV: RRR, no murmurs, no extra heart sounds, 2+ peripheral pulses  Resp: CTAB, good effort without distress   Abdomen: soft, non tender, non distended, no masses, no hernias. No inguinal lymphadenopathy.   Perineum: no lesions; normal appearing external genitalia, bartholins glands, urethra, skenes glands  Vagina : no masses or lesions or discharge, normally rugated.  Cervix: mildly tender, no masses or lesions or discharge   Bimanual exam:   Pelvic floor muscles non tender  Urethra tender   Bladder- tender, without massess  Uterus- midline , retroverted, mobile , no masses, mildly tender  Adnexa - no masses or tenderness   No cervical motion tenderness  Lower extremities: non-tender, no edema  Skin: no lesions or rashes    Labs & Imaging:  Results for orders placed or performed in visit on 01/09/19 (from the past 24 hour(s))   Ureaplasma culture   Result Value Ref [place]    Specimen Description Cervical     Special Requests Specimen received in [employer]     Culture Micro PENDING    Hemoglobin A1c   Result Value Ref [place]    Hemoglobin A1C 4.6 0 - 5.6 %   Lipid panel reflex to direct LDL Fasting   Result Value Ref [place]    Cholesterol 202 (H) <200 mg/dL    Triglycerides 60 <150 mg/dL    HDL Cholesterol 69 >49 mg/dL    LDL Cholesterol Calculated 121 (H) <100 mg/dL    Non HDL Cholesterol 133 (H) <130 mg/dL     Hgb 13.1  HIV neg  Wet prep WNL  UA WNL    Pelvic Ultrasound (1/7/19)   Uterus measures 8.5 x 5.3 x 5.6 cm and is unremarkable.  No focal  lesions  are seen in the myometrium.  Endometrium is 6 mm thick and  appears normal.    The right ovary measures 3.8 x 2.5 x 2.3 cm the left ovary measures  2.7 x 1.9 x 1.5 cm  There are no adnexal masses.  There is no free  fluid in the cul-de-sac.      ASSESSMENT/PLAN: Anastacia Paez is a 38 year old  woman who presents for multiple concerns including pelvic pain, increasingly heavy painful periods with intolerance of hormones, vaginal infections    Total time spent face to face was 30 minutes. Greater than 50% of the time was spent counseling and/or coordination or care.    1. Vaginal discharge & odor  - Exam notable for focal urethra and bladder pain without mass nor nodularity, with recent normal wet prep and UA, as well as multiple prior negative STI swabs. Vagina appears normal, healthy on exam. She did have positive Ureaplasma in May, was treated, and repeat was negative in July. She is worried about a possible reoccurrence though given otherwise negative work up. We will repeat a ureaplasma culture today.   - Ureaplasma culture    2. Concern for hormone imbalance  - She does report hirsutism, now s/p laser hair removal, requests tests for androgen imbalance, she is on day #2 of her cycle today. I am also adding routine lipid panel and A1c given these concerns.   - Follicle stimulating hormone  - Lutropin  - Estradiol  - Hemoglobin A1c  - Lipid panel reflex to direct LDL Fasting  - Testosterone Free and Total FUTURE anytime    3. Urinary tract symptoms including urge and stress incontinence, urethral and bladder pain  - Urology referral placed, now scheduled     4. Menorrhagia, dysmenorrhea   - She is interested in surgical management given intolerance of hormones. We reviewed ablation and hysterectomy, and she is not interested in ablation. I discussed that hysterectomy is a major surgery with risks including bleeding, infection, damage to surrounding organs, potential wound complications or prolonged recovery  period, and that hysterectomies may not resolve pelvic pain. I also discussed that this is not urgent and she should see urology for evaluation and recommendations prior to any surgery planning. She would be a good vaginal hysterectomy candidate. We will discuss this at a later date.     Richie King MD  1/9/2019 8:04 PM

## 2019-01-11 DIAGNOSIS — N93.9 ABNORMAL UTERINE BLEEDING (AUB): Primary | ICD-10-CM

## 2019-01-11 LAB
SHBG SERPL-SCNC: 96 NMOL/L (ref 30–135)
TESTOST FREE SERPL-MCNC: 0.16 NG/DL (ref 0.13–0.92)
TESTOST SERPL-MCNC: 22 NG/DL (ref 8–60)

## 2019-01-14 ENCOUNTER — TELEPHONE (OUTPATIENT)
Dept: FAMILY MEDICINE | Facility: OTHER | Age: 39
End: 2019-01-14

## 2019-01-14 NOTE — TELEPHONE ENCOUNTER
Spoke to patient and she said that Dr. Hurt is going to do a cysto at the same time that Dr. King is doing surgery. Are you OK with that? Dr. Hurt is only on [place] on Thursday AMs so I can see if we can coordinate another day If you are OK with him doing a cysto at the same time.

## 2019-01-14 NOTE — TELEPHONE ENCOUNTER
Type of surgery: total vaginal hysterectomy, bilateral salpingectomy  Location of surgery: [facility]   Date of surgery: 2/1/19  Surgeon: Dr. King  Pre-Op Appt Date: patient states that she will schedule her preop on mychart.   Post-Op Appt Date: 2/12/19   Packet sent out: Surgery packet mailed to patient's home address.   Pre-cert/Authorization completed: NA  Date: 1/14/2019    Phylicia Ny  Surgery Scheduler

## 2019-01-17 LAB
BACTERIA SPEC CULT: NORMAL
Lab: NORMAL
SPECIMEN SOURCE: NORMAL

## 2019-01-28 ENCOUNTER — OFFICE VISIT (OUTPATIENT)
Dept: FAMILY MEDICINE | Facility: CLINIC | Age: 39
End: 2019-01-28
Payer: COMMERCIAL

## 2019-01-28 VITALS
HEART RATE: 68 BPM | DIASTOLIC BLOOD PRESSURE: 64 MMHG | SYSTOLIC BLOOD PRESSURE: 110 MMHG | HEIGHT: 65 IN | RESPIRATION RATE: 18 BRPM | WEIGHT: 145 LBS | BODY MASS INDEX: 24.16 KG/M2 | TEMPERATURE: 99.4 F

## 2019-01-28 DIAGNOSIS — N94.6 DYSMENORRHEA: ICD-10-CM

## 2019-01-28 DIAGNOSIS — Z01.818 PREOP GENERAL PHYSICAL EXAM: Primary | ICD-10-CM

## 2019-01-28 PROCEDURE — 99214 OFFICE O/P EST MOD 30 MIN: CPT | Performed by: NURSE PRACTITIONER

## 2019-01-28 ASSESSMENT — MIFFLIN-ST. JEOR: SCORE: 1334.63

## 2019-01-28 NOTE — PROGRESS NOTES
[facility]  [address] [place] [id number]  [phone]  Dept: [phone]    PRE-OP EVALUATION:  Today's date: 2019    Anastacia Paez (: 1980) presents for pre-operative evaluation assessment as requested by Dr. King.  She requires evaluation and anesthesia risk assessment prior to undergoing surgery/procedure for treatment of abnormal uterine bleeding .    Fax number for surgical facility: available electronically   Primary Physician: Kathryn Renteria  Type of Anesthesia Anticipated: other     Patient has a Health Care Directive or Living Will:  YES     Preop Questions 2019   Who is doing your surgery? Dr Richie King   What are you having done? Hysterectomy   Date of Surgery/Procedure: 19   Facility or Hospital where procedure/surgery will be performed: [facility]   1.  Do you have a history of Heart attack, stroke, stent, coronary bypass surgery, or other heart surgery? No   2.  Do you ever have any pain or discomfort in your chest? No   3.  Do you have a history of  Heart Failure? No   4.   Are you troubled by shortness of breath when:  walking on a level surface, or up a slight hill, or at night? No   5.  Do you currently have a cold, bronchitis or other respiratory infection? No   6.  Do you have a cough, shortness of breath, or wheezing? No   7.  Do you sometimes get pains in the calves of your legs when you walk? No   8. Do you or anyone in your family have previous history of blood clots? No   9.  Do you or does anyone in your family have a serious bleeding problem such as prolonged bleeding following surgeries or cuts? No   10. Have you ever had problems with anemia or been told to take iron pills? No   11. Have you had any abnormal blood loss such as black, tarry or bloody stools, or abnormal vaginal bleeding? No   12. Have you ever had a blood transfusion? No   13. Have you or any of your relatives ever had problems with anesthesia?  No   14. Do you have sleep apnea, excessive snoring or daytime drowsiness? No   15. Do you have any prosthetic heart valves? No   16. Do you have prosthetic joints? No   17. Is there any chance that you may be pregnant? No         HPI:     HPI related to upcoming procedure: abnormal uterine bleeding and pelvic pain in need of hysterectomy     See problem list for active medical problems.  Problems all longstanding and stable, except as noted/documented.  See ROS for pertinent symptoms related to these conditions.                                                                                                                                                          .    MEDICAL HISTORY:     Patient Active Problem List    Diagnosis Date Noted     Sun-damaged skin 10/19/2017     Priority: Medium     Lichen simplex chronicus 10/19/2017     Priority: Medium     Herpes simplex virus infection 02/02/2017     Priority: Medium     perioral       Anxiety 12/30/2015     Priority: Medium     RX monitoring program ([place]) reviewed: [facility] reviewed- 10/16/18    [place] profile:  [email or web address]         Seasonal allergic rhinitis 11/17/2015     Priority: Medium     Encounter for initial prescription of intrauterine contraceptive device 11/17/2015     Priority: Medium     Panic attack 11/17/2015     Priority: Medium     Bilateral low back pain without sciatica 11/17/2015     Priority: Medium     Dysmenorrhea 11/17/2015     Priority: Medium      Past Medical History:   Diagnosis Date     Abnormal Pap smear of cervix 05/06/2014    See problem list     Adjustment disorder with anxious mood 11/17/2015     Adjustment disorder with mixed anxiety and depressed mood 11/17/2015     Anxiety 12/30/2015     Bilateral low back pain without sciatica 11/17/2015     Dysmenorrhea 11/17/2015     Encounter for initial prescription of intrauterine contraceptive device 11/17/2015     Herpes simplex virus infection 2/2/2017    perioral      Panic  attack 11/17/2015     Psoriasis      Seasonal allergic rhinitis 11/17/2015     Sun-damaged skin 10/19/2017     Past Surgical History:   Procedure Laterality Date     ABDOMINOPLASTY  2014     APPENDECTOMY  1992     C BREAST AUGMENTATION  2010     Current Outpatient Medications   Medication Sig Dispense Refill     ALPRAZolam (XANAX) 0.5 MG tablet Take 1 tablet (0.5 mg) by mouth 3 times daily as needed for anxiety 90 tablet 1     augmented betamethasone dipropionate (DIPROLENE-AF) 0.05 % cream Apply to AA BID x 2-3 weeks then PRN 50 g 1     cetirizine (ZYRTEC) 10 MG tablet Take 10 mg by mouth daily       citalopram (CELEXA) 10 MG tablet Take 1 tablet (10 mg) by mouth daily 90 tablet 3     valACYclovir (VALTREX) 1000 mg tablet Take 1 tablet (1,000 mg) by mouth daily 90 tablet 1     diclofenac (VOLTAREN) 1 % GEL As needed for back pain       lidocaine (XYLOCAINE) 2 % jelly Apply topically as needed for moderate pain Three time daily as needed       tretinoin (RETIN-A) 0.1 % cream Apply topically At Bedtime (Patient not taking: Reported on 1/28/2019) 45 g 1     OTC products: None, except as noted above    Allergies   Allergen Reactions     Morphine Itching      Latex Allergy: NO    Social History     Tobacco Use     Smoking status: Former Smoker     Last attempt to quit: 1/1/2004     Years since quitting: 15.0     Smokeless tobacco: Never Used   Substance Use Topics     Alcohol use: Yes     Frequency: Monthly or less     Comment: occasionally     History   Drug Use No       REVIEW OF SYSTEMS:   CONSTITUTIONAL: NEGATIVE for fever, chills, change in weight  INTEGUMENTARY/SKIN: NEGATIVE for worrisome rashes, moles or lesions  EYES: NEGATIVE for vision changes or irritation  ENT/MOUTH: NEGATIVE for ear, mouth and throat problems  RESP: NEGATIVE for significant cough or SOB  BREAST: NEGATIVE for masses, tenderness or discharge  CV: NEGATIVE for chest pain, palpitations or peripheral edema  GI: NEGATIVE for nausea, abdominal  "pain, heartburn, or change in bowel habits  : NEGATIVE for frequency, dysuria, or hematuria  MUSCULOSKELETAL: NEGATIVE for significant arthralgias or myalgia  NEURO: NEGATIVE for weakness, dizziness or paresthesias  ENDOCRINE: NEGATIVE for temperature intolerance, skin/hair changes  HEME: NEGATIVE for bleeding problems  PSYCHIATRIC: NEGATIVE for changes in mood or affect    EXAM:   /64 (Cuff Size: Adult Large)   Pulse 68   Temp 99.4  F (37.4  C) (Tympanic)   Resp 18   Ht 1.645 m (5' 4.75\")   Wt 65.8 kg (145 lb)   LMP 01/07/2019   BMI 24.32 kg/m      GENERAL APPEARANCE: healthy, alert and no distress     EYES: EOMI, PERRL     HENT: ear canals and TM's normal and nose and mouth without ulcers or lesions     NECK: no adenopathy, no asymmetry, masses, or scars and thyroid normal to palpation     RESP: lungs clear to auscultation - no rales, rhonchi or wheezes     CV: regular rates and rhythm, normal S1 S2, no S3 or S4 and no murmur, click or rub     ABDOMEN:  soft, nontender, no HSM or masses and bowel sounds normal     MS: extremities normal- no gross deformities noted, no evidence of inflammation in joints, FROM in all extremities.     SKIN: no suspicious lesions or rashes     NEURO: Normal strength and tone, sensory exam grossly normal, mentation intact and speech normal     PSYCH: mentation appears normal. and affect normal/bright     LYMPHATICS: No cervical adenopathy    DIAGNOSTICS:   EKG: Not indicated due to non-vascular surgery and low risk of event (age <65 and without cardiac risk factors)    Recent Labs   Lab Test 01/09/19  1608 01/07/19  1414   HGB  --  13.1   PLT  --  229   A1C 4.6  --         IMPRESSION:   Reason for surgery/procedure: abnormal uterine bleeding and pelvic pain in need of hysterectomy       The proposed surgical procedure is considered INTERMEDIATE risk.    REVISED CARDIAC RISK INDEX  The patient has the following serious cardiovascular risks for perioperative complications " such as (MI, PE, VFib and 3  AV Block):  No serious cardiac risks  INTERPRETATION: 0 risks: Class I (very low risk - 0.4% complication rate)    The patient has the following additional risks for perioperative complications:  No identified additional risks      ICD-10-CM    1. Preop general physical exam Z01.818    2. Dysmenorrhea N94.6        RECOMMENDATIONS:     --Patient is to take all scheduled medications on the day of surgery EXCEPT for modifications listed below.    APPROVAL GIVEN to proceed with proposed procedure, without further diagnostic evaluation       Signed Electronically by: JUDI Albreto CNP    Copy of this evaluation report is provided to requesting physician.    [facility] Preop Guidelines    Revised Cardiac Risk Index

## 2019-01-31 ENCOUNTER — ANESTHESIA EVENT (OUTPATIENT)
Dept: SURGERY | Facility: CLINIC | Age: 39
End: 2019-01-31
Payer: COMMERCIAL

## 2019-01-31 ENCOUNTER — TRANSFERRED RECORDS (OUTPATIENT)
Dept: HEALTH INFORMATION MANAGEMENT | Facility: CLINIC | Age: 39
End: 2019-01-31

## 2019-02-01 ENCOUNTER — HOSPITAL ENCOUNTER (OUTPATIENT)
Facility: CLINIC | Age: 39
Discharge: HOME | End: 2019-02-01
Attending: OBSTETRICS & GYNECOLOGY | Admitting: OBSTETRICS & GYNECOLOGY
Payer: COMMERCIAL

## 2019-02-01 ENCOUNTER — ANESTHESIA (OUTPATIENT)
Dept: SURGERY | Facility: CLINIC | Age: 39
End: 2019-02-01
Payer: COMMERCIAL

## 2019-02-01 VITALS
TEMPERATURE: 97.7 F | DIASTOLIC BLOOD PRESSURE: 49 MMHG | SYSTOLIC BLOOD PRESSURE: 98 MMHG | RESPIRATION RATE: 12 BRPM | OXYGEN SATURATION: 99 % | HEART RATE: 67 BPM

## 2019-02-01 DIAGNOSIS — Z90.710 S/P HYSTERECTOMY: Primary | ICD-10-CM

## 2019-02-01 LAB
B-HCG SERPL-ACNC: <1 IU/L (ref 0–5)
ERYTHROCYTE [DISTWIDTH] IN BLOOD BY AUTOMATED COUNT: 11.6 % (ref 10–15)
HCT VFR BLD AUTO: 36.5 % (ref 35–47)
HGB BLD-MCNC: 12.9 G/DL (ref 11.7–15.7)
MCH RBC QN AUTO: 31.9 PG (ref 26.5–33)
MCHC RBC AUTO-ENTMCNC: 35.3 G/DL (ref 31.5–36.5)
MCV RBC AUTO: 90 FL (ref 78–100)
PLATELET # BLD AUTO: 210 10E9/L (ref 150–450)
RBC # BLD AUTO: 4.04 10E12/L (ref 3.8–5.2)
WBC # BLD AUTO: 6.3 10E9/L (ref 4–11)

## 2019-02-01 PROCEDURE — 88307 TISSUE EXAM BY PATHOLOGIST: CPT | Performed by: OBSTETRICS & GYNECOLOGY

## 2019-02-01 PROCEDURE — 85027 COMPLETE CBC AUTOMATED: CPT | Performed by: OBSTETRICS & GYNECOLOGY

## 2019-02-01 PROCEDURE — 25000125 ZZHC RX 250: Performed by: NURSE ANESTHETIST, CERTIFIED REGISTERED

## 2019-02-01 PROCEDURE — 25000128 H RX IP 250 OP 636: Performed by: NURSE ANESTHETIST, CERTIFIED REGISTERED

## 2019-02-01 PROCEDURE — 88307 TISSUE EXAM BY PATHOLOGIST: CPT | Mod: 26 | Performed by: OBSTETRICS & GYNECOLOGY

## 2019-02-01 PROCEDURE — 36000058 ZZH SURGERY LEVEL 3 EA 15 ADDTL MIN: Performed by: OBSTETRICS & GYNECOLOGY

## 2019-02-01 PROCEDURE — 36415 COLL VENOUS BLD VENIPUNCTURE: CPT | Performed by: OBSTETRICS & GYNECOLOGY

## 2019-02-01 PROCEDURE — 84702 CHORIONIC GONADOTROPIN TEST: CPT | Performed by: OBSTETRICS & GYNECOLOGY

## 2019-02-01 PROCEDURE — 25000566 ZZH SEVOFLURANE, EA 15 MIN: Performed by: OBSTETRICS & GYNECOLOGY

## 2019-02-01 PROCEDURE — 25000132 ZZH RX MED GY IP 250 OP 250 PS 637: Performed by: OBSTETRICS & GYNECOLOGY

## 2019-02-01 PROCEDURE — 25000128 H RX IP 250 OP 636: Performed by: OBSTETRICS & GYNECOLOGY

## 2019-02-01 PROCEDURE — 71000027 ZZH RECOVERY PHASE 2 EACH 15 MINS: Performed by: OBSTETRICS & GYNECOLOGY

## 2019-02-01 PROCEDURE — 36000056 ZZH SURGERY LEVEL 3 1ST 30 MIN: Performed by: OBSTETRICS & GYNECOLOGY

## 2019-02-01 PROCEDURE — 71000014 ZZH RECOVERY PHASE 1 LEVEL 2 FIRST HR: Performed by: OBSTETRICS & GYNECOLOGY

## 2019-02-01 PROCEDURE — 58262 VAG HYST INCLUDING T/O: CPT | Performed by: OBSTETRICS & GYNECOLOGY

## 2019-02-01 PROCEDURE — 37000009 ZZH ANESTHESIA TECHNICAL FEE, EACH ADDTL 15 MIN: Performed by: OBSTETRICS & GYNECOLOGY

## 2019-02-01 PROCEDURE — 27210794 ZZH OR GENERAL SUPPLY STERILE: Performed by: OBSTETRICS & GYNECOLOGY

## 2019-02-01 PROCEDURE — 40000306 ZZH STATISTIC PRE PROC ASSESS II: Performed by: OBSTETRICS & GYNECOLOGY

## 2019-02-01 PROCEDURE — 37000008 ZZH ANESTHESIA TECHNICAL FEE, 1ST 30 MIN: Performed by: OBSTETRICS & GYNECOLOGY

## 2019-02-01 RX ORDER — HEPARIN SODIUM 5000 [USP'U]/.5ML
5000 INJECTION, SOLUTION INTRAVENOUS; SUBCUTANEOUS
Status: COMPLETED | OUTPATIENT
Start: 2019-02-01 | End: 2019-02-01

## 2019-02-01 RX ORDER — KETOROLAC TROMETHAMINE 30 MG/ML
INJECTION, SOLUTION INTRAMUSCULAR; INTRAVENOUS PRN
Status: DISCONTINUED | OUTPATIENT
Start: 2019-02-01 | End: 2019-02-01

## 2019-02-01 RX ORDER — SODIUM CHLORIDE, SODIUM LACTATE, POTASSIUM CHLORIDE, CALCIUM CHLORIDE 600; 310; 30; 20 MG/100ML; MG/100ML; MG/100ML; MG/100ML
INJECTION, SOLUTION INTRAVENOUS CONTINUOUS
Status: DISCONTINUED | OUTPATIENT
Start: 2019-02-01 | End: 2019-02-01 | Stop reason: HOSPADM

## 2019-02-01 RX ORDER — CEFAZOLIN SODIUM 1 G/3ML
1 INJECTION, POWDER, FOR SOLUTION INTRAMUSCULAR; INTRAVENOUS SEE ADMIN INSTRUCTIONS
Status: DISCONTINUED | OUTPATIENT
Start: 2019-02-01 | End: 2019-02-01 | Stop reason: HOSPADM

## 2019-02-01 RX ORDER — ONDANSETRON 2 MG/ML
INJECTION INTRAMUSCULAR; INTRAVENOUS PRN
Status: DISCONTINUED | OUTPATIENT
Start: 2019-02-01 | End: 2019-02-01

## 2019-02-01 RX ORDER — FENTANYL CITRATE 50 UG/ML
25-50 INJECTION, SOLUTION INTRAMUSCULAR; INTRAVENOUS
Status: DISCONTINUED | OUTPATIENT
Start: 2019-02-01 | End: 2019-02-01 | Stop reason: HOSPADM

## 2019-02-01 RX ORDER — PROPOFOL 10 MG/ML
INJECTION, EMULSION INTRAVENOUS PRN
Status: DISCONTINUED | OUTPATIENT
Start: 2019-02-01 | End: 2019-02-01

## 2019-02-01 RX ORDER — FENTANYL CITRATE 50 UG/ML
INJECTION, SOLUTION INTRAMUSCULAR; INTRAVENOUS PRN
Status: DISCONTINUED | OUTPATIENT
Start: 2019-02-01 | End: 2019-02-01

## 2019-02-01 RX ORDER — HYDROCODONE BITARTRATE AND ACETAMINOPHEN 5; 325 MG/1; MG/1
1-2 TABLET ORAL EVERY 4 HOURS PRN
Qty: 15 TABLET | Refills: 0 | Status: SHIPPED | OUTPATIENT
Start: 2019-02-01 | End: 2019-02-04

## 2019-02-01 RX ORDER — HYDROCODONE BITARTRATE AND ACETAMINOPHEN 5; 325 MG/1; MG/1
2 TABLET ORAL
Status: COMPLETED | OUTPATIENT
Start: 2019-02-01 | End: 2019-02-01

## 2019-02-01 RX ORDER — ONDANSETRON 4 MG/1
4 TABLET, ORALLY DISINTEGRATING ORAL EVERY 30 MIN PRN
Status: DISCONTINUED | OUTPATIENT
Start: 2019-02-01 | End: 2019-02-01 | Stop reason: HOSPADM

## 2019-02-01 RX ORDER — DIMENHYDRINATE 50 MG/ML
25 INJECTION, SOLUTION INTRAMUSCULAR; INTRAVENOUS
Status: DISCONTINUED | OUTPATIENT
Start: 2019-02-01 | End: 2019-02-01 | Stop reason: HOSPADM

## 2019-02-01 RX ORDER — ONDANSETRON 4 MG/1
4-8 TABLET, ORALLY DISINTEGRATING ORAL EVERY 8 HOURS PRN
Qty: 4 TABLET | Refills: 0 | Status: SHIPPED | OUTPATIENT
Start: 2019-02-01 | End: 2019-02-08

## 2019-02-01 RX ORDER — LIDOCAINE HYDROCHLORIDE 20 MG/ML
INJECTION, SOLUTION INFILTRATION; PERINEURAL PRN
Status: DISCONTINUED | OUTPATIENT
Start: 2019-02-01 | End: 2019-02-01

## 2019-02-01 RX ORDER — NALOXONE HYDROCHLORIDE 0.4 MG/ML
.1-.4 INJECTION, SOLUTION INTRAMUSCULAR; INTRAVENOUS; SUBCUTANEOUS
Status: DISCONTINUED | OUTPATIENT
Start: 2019-02-01 | End: 2019-02-01 | Stop reason: HOSPADM

## 2019-02-01 RX ORDER — ONDANSETRON 2 MG/ML
4 INJECTION INTRAMUSCULAR; INTRAVENOUS EVERY 30 MIN PRN
Status: DISCONTINUED | OUTPATIENT
Start: 2019-02-01 | End: 2019-02-01 | Stop reason: HOSPADM

## 2019-02-01 RX ORDER — SCOLOPAMINE TRANSDERMAL SYSTEM 1 MG/1
1 PATCH, EXTENDED RELEASE TRANSDERMAL
Status: DISCONTINUED | OUTPATIENT
Start: 2019-02-01 | End: 2019-02-01 | Stop reason: HOSPADM

## 2019-02-01 RX ORDER — IBUPROFEN 600 MG/1
600 TABLET, FILM COATED ORAL EVERY 6 HOURS PRN
Qty: 30 TABLET | Refills: 0 | Status: SHIPPED | OUTPATIENT
Start: 2019-02-01 | End: 2019-02-19

## 2019-02-01 RX ORDER — MEPERIDINE HYDROCHLORIDE 25 MG/ML
12.5 INJECTION INTRAMUSCULAR; INTRAVENOUS; SUBCUTANEOUS
Status: DISCONTINUED | OUTPATIENT
Start: 2019-02-01 | End: 2019-02-01 | Stop reason: HOSPADM

## 2019-02-01 RX ORDER — DEXAMETHASONE SODIUM PHOSPHATE 10 MG/ML
INJECTION, SOLUTION INTRAMUSCULAR; INTRAVENOUS PRN
Status: DISCONTINUED | OUTPATIENT
Start: 2019-02-01 | End: 2019-02-01

## 2019-02-01 RX ORDER — PROPOFOL 10 MG/ML
INJECTION, EMULSION INTRAVENOUS CONTINUOUS PRN
Status: DISCONTINUED | OUTPATIENT
Start: 2019-02-01 | End: 2019-02-01

## 2019-02-01 RX ORDER — ONDANSETRON 4 MG/1
4 TABLET, ORALLY DISINTEGRATING ORAL
Status: DISCONTINUED | OUTPATIENT
Start: 2019-02-01 | End: 2019-02-01 | Stop reason: HOSPADM

## 2019-02-01 RX ORDER — DIMENHYDRINATE 50 MG/ML
INJECTION, SOLUTION INTRAMUSCULAR; INTRAVENOUS PRN
Status: DISCONTINUED | OUTPATIENT
Start: 2019-02-01 | End: 2019-02-01

## 2019-02-01 RX ORDER — HYDROCODONE BITARTRATE AND ACETAMINOPHEN 5; 325 MG/1; MG/1
1-2 TABLET ORAL EVERY 6 HOURS PRN
Status: DISCONTINUED | OUTPATIENT
Start: 2019-02-01 | End: 2019-02-01 | Stop reason: HOSPADM

## 2019-02-01 RX ORDER — HYDRALAZINE HYDROCHLORIDE 20 MG/ML
2.5-5 INJECTION INTRAMUSCULAR; INTRAVENOUS EVERY 10 MIN PRN
Status: DISCONTINUED | OUTPATIENT
Start: 2019-02-01 | End: 2019-02-01 | Stop reason: HOSPADM

## 2019-02-01 RX ORDER — ACETAMINOPHEN 325 MG/1
650 TABLET ORAL
Status: DISCONTINUED | OUTPATIENT
Start: 2019-02-01 | End: 2019-02-01 | Stop reason: HOSPADM

## 2019-02-01 RX ORDER — GLYCOPYRROLATE 0.2 MG/ML
INJECTION, SOLUTION INTRAMUSCULAR; INTRAVENOUS PRN
Status: DISCONTINUED | OUTPATIENT
Start: 2019-02-01 | End: 2019-02-01

## 2019-02-01 RX ORDER — ALBUTEROL SULFATE 0.83 MG/ML
2.5 SOLUTION RESPIRATORY (INHALATION) EVERY 4 HOURS PRN
Status: DISCONTINUED | OUTPATIENT
Start: 2019-02-01 | End: 2019-02-01 | Stop reason: HOSPADM

## 2019-02-01 RX ORDER — HYDROMORPHONE HYDROCHLORIDE 1 MG/ML
.3-.5 INJECTION, SOLUTION INTRAMUSCULAR; INTRAVENOUS; SUBCUTANEOUS EVERY 10 MIN PRN
Status: DISCONTINUED | OUTPATIENT
Start: 2019-02-01 | End: 2019-02-01 | Stop reason: HOSPADM

## 2019-02-01 RX ORDER — CEFAZOLIN SODIUM 2 G/100ML
2 INJECTION, SOLUTION INTRAVENOUS
Status: COMPLETED | OUTPATIENT
Start: 2019-02-01 | End: 2019-02-01

## 2019-02-01 RX ORDER — AMOXICILLIN 250 MG
1-2 CAPSULE ORAL 2 TIMES DAILY
Qty: 120 TABLET | Refills: 0 | Status: SHIPPED | OUTPATIENT
Start: 2019-02-01 | End: 2019-03-03

## 2019-02-01 RX ADMIN — MIDAZOLAM 2 MG: 1 INJECTION INTRAMUSCULAR; INTRAVENOUS at 07:33

## 2019-02-01 RX ADMIN — SODIUM CHLORIDE, POTASSIUM CHLORIDE, SODIUM LACTATE AND CALCIUM CHLORIDE: 600; 310; 30; 20 INJECTION, SOLUTION INTRAVENOUS at 07:05

## 2019-02-01 RX ADMIN — DEXAMETHASONE SODIUM PHOSPHATE 10 MG: 10 INJECTION, SOLUTION INTRAMUSCULAR; INTRAVENOUS at 07:48

## 2019-02-01 RX ADMIN — ONDANSETRON 4 MG: 2 INJECTION INTRAMUSCULAR; INTRAVENOUS at 07:48

## 2019-02-01 RX ADMIN — FENTANYL CITRATE 100 MCG: 50 INJECTION, SOLUTION INTRAMUSCULAR; INTRAVENOUS at 07:33

## 2019-02-01 RX ADMIN — SODIUM CHLORIDE, POTASSIUM CHLORIDE, SODIUM LACTATE AND CALCIUM CHLORIDE: 600; 310; 30; 20 INJECTION, SOLUTION INTRAVENOUS at 07:33

## 2019-02-01 RX ADMIN — SODIUM CHLORIDE, POTASSIUM CHLORIDE, SODIUM LACTATE AND CALCIUM CHLORIDE: 600; 310; 30; 20 INJECTION, SOLUTION INTRAVENOUS at 08:12

## 2019-02-01 RX ADMIN — HYDROCODONE BITARTRATE AND ACETAMINOPHEN 2 TABLET: 5; 325 TABLET ORAL at 10:17

## 2019-02-01 RX ADMIN — HEPARIN SODIUM 5000 UNITS: 5000 INJECTION, SOLUTION INTRAVENOUS; SUBCUTANEOUS at 07:02

## 2019-02-01 RX ADMIN — SCOPALAMINE 1 PATCH: 1 PATCH, EXTENDED RELEASE TRANSDERMAL at 07:10

## 2019-02-01 RX ADMIN — PROPOFOL 160 MG: 10 INJECTION, EMULSION INTRAVENOUS at 07:40

## 2019-02-01 RX ADMIN — DIMENHYDRINATE 25 MG: 50 INJECTION, SOLUTION INTRAMUSCULAR; INTRAVENOUS at 07:48

## 2019-02-01 RX ADMIN — FENTANYL CITRATE 50 MCG: 50 INJECTION, SOLUTION INTRAMUSCULAR; INTRAVENOUS at 08:05

## 2019-02-01 RX ADMIN — KETOROLAC TROMETHAMINE 30 MG: 30 INJECTION, SOLUTION INTRAMUSCULAR at 08:51

## 2019-02-01 RX ADMIN — HYDROMORPHONE HYDROCHLORIDE 0.5 MG: 1 INJECTION, SOLUTION INTRAMUSCULAR; INTRAVENOUS; SUBCUTANEOUS at 09:40

## 2019-02-01 RX ADMIN — CEFAZOLIN SODIUM 2 G: 2 INJECTION, SOLUTION INTRAVENOUS at 07:44

## 2019-02-01 RX ADMIN — LIDOCAINE HYDROCHLORIDE 50 MG: 20 INJECTION, SOLUTION INFILTRATION; PERINEURAL at 07:40

## 2019-02-01 RX ADMIN — FENTANYL CITRATE 50 MCG: 50 INJECTION, SOLUTION INTRAMUSCULAR; INTRAVENOUS at 08:07

## 2019-02-01 RX ADMIN — GLYCOPYRROLATE 0.2 MG: 0.2 INJECTION, SOLUTION INTRAMUSCULAR; INTRAVENOUS at 08:00

## 2019-02-01 RX ADMIN — PROPOFOL 150 MCG/KG/MIN: 10 INJECTION, EMULSION INTRAVENOUS at 07:39

## 2019-02-01 RX ADMIN — HYDROMORPHONE HYDROCHLORIDE 0.5 MG: 1 INJECTION, SOLUTION INTRAMUSCULAR; INTRAVENOUS; SUBCUTANEOUS at 07:46

## 2019-02-01 ASSESSMENT — LIFESTYLE VARIABLES: TOBACCO_USE: 1

## 2019-02-01 NOTE — OP NOTE
Operative Note    PREOPERATIVE DIAGNOSIS:  38 year old   Menorrhagia   Dyspareunia    POSTOPERATIVE DIAGNOSIS:  Same as above, s/p below listed procedures    PROCEDURE PERFORMED:  EUA  Total Vaginal Hysterectomy  Bilateral salpingectomy  Left oophorectomy     SURGEON: Richie King MD    ANESTHESIA:  General endotracheal    ESTIMATED BLOOD LOSS:  25 cc.    IV FLUIDS:  1000 mL LR.    URINE OUTPUT:  25 cc clear urine     COMPLICATIONS:  None.    SPECIMENS:  Uterus including cervix, bilateral tubes, left ovary     FINDINGS:  Normal appearing genitalia, vagina, uterus, cervix, tubes and ovaries.    INDICATIONS  Anastacia Paez is a 38 year old  woman who presents for multiple concerns including pelvic pain, increasingly heavy painful periods with intolerance of hormones, vaginal infections. She is interested in surgical management given intolerance of hormones. We reviewed ablation and hysterectomy, and she is not interested in ablation. I discussed that hysterectomy is a major surgery with risks including bleeding, infection, damage to surrounding organs, potential wound complications or prolonged recovery period, and that hysterectomies may not resolve pelvic pain. The risks, benefits, and alternatives of the above stated procedure were discussed, and she consented to proceed.     DESCRIPTION OF OPERATION:  EUA revealed small, mobile, anteverted uterus with no adnexal masses. There was moderate descensus of the uterus to within 3 cm of the hymen with cervical traction when under anesthesia. The uterus was grossly normal in appearance.  The ovaries were visualized and noted to be small and normal in appearance with no gross lesions.    PROCEDURE:  The patient was brought to the operating room, where she was placed in the dorsal lithotomy position in candy cane stirups underwent general endotracheal anesthesia without difficulty. Universal protocol was carried out. A stephen catheter was placed and exam under  anesthesia revealed the above noted findings. Two single-tooth tenaculums were placed and used for traction. 20 cc of dilute vasopressin was used circumferentially around the cervix to assist with hydro-dissection. A circumferential incision was then made with the Bovie cautery and the bladder and anterior vaginal mucosa were then pushed off the uterus. The anterior peritoneum was entered by sharp dissection and the bladder was retracted out of the way. Similarly, the posterior vaginal mucosa was dissected off bluntly and sharply and the posterior peritoneum was entered sharply. The long weighted speculum was then placed. Uterosacrals on each side were clamped, cauterized, and ligated with the Ligasure. Successive bites were then taken up toward the fundus of the uterus using a sponge stitch to retract bowel and suction used for smoke evacuation. The left ovary was connected closely and slightly posteriorly to the uterine body and was incidentally removed with the uterus.  Once the fundus was reached, the uterus, cervix, and left ovary and tube were removed as a single specimen. The right fallopian tube was then grasped with the tucker clamp and removed with the Ligasure. All pedicles were inspected and noted to be hemostatic.  The cuff was then closed anterior to posterior via interrupted figure-of-x sutures of O-vicryl.  At the angles of the colpotomy, care was taken to incorporate the uterosacral ligaments in the cuff closure for apical support.  Peritoneum was included in the closure both anteriorly and posteriorly.  The cuff was noted to be hemostatic after closure.      A Mccain catheter was then removed. The vagina was not packed.  The patient was extubated, awakened in the OR and taken to recovery in stable condition. Sponge, lap and needle counts were correct x 2. The patient received 2 grams of ancef prior to the procedure.     Richie King MD  February 1, 2019 9:13 AM

## 2019-02-01 NOTE — ANESTHESIA POSTPROCEDURE EVALUATION
Patient: Anastacia Paez    Procedure(s):  TOTAL VAGINAL HYSTERECTOMY  BILATERAL SALPINGECTOMY  left Oophorectomy    Diagnosis:AUB, intolerance of hormones  Diagnosis Additional Information: No value filed.    Anesthesia Type:  General, ETT    Note:  Anesthesia Post Evaluation    Patient location during evaluation: Phase 2  Patient participation: Able to fully participate in evaluation  Level of consciousness: awake  Pain management: adequate  Airway patency: patent  Cardiovascular status: acceptable and hemodynamically stable  Respiratory status: acceptable, room air and nonlabored ventilation  Hydration status: stable  PONV: none     Anesthetic complications: None    Comments: Patient was happy with the anesthesia care received and no anesthesia related complications were noted.  I will follow up with the patient again if it is needed.        Last vitals:  Vitals:    02/01/19 0910 02/01/19 0920 02/01/19 0930   BP: 93/49 (!) 84/53 90/49   Pulse: 74 80 81   Resp: 9 13 12   Temp:  97.7  F (36.5  C)    SpO2: 96% 98% 100%         Electronically Signed By: JUDI Resendez CRNA  February 1, 2019  10:25 AM

## 2019-02-01 NOTE — DISCHARGE INSTRUCTIONS
[facility]  Discharge Instruction Following Laparoscopic/Abdominal Hysterectomy  Dr. King  Specialty Clinic: [phone]      You should expect some pain for the first several weeks which should gradually decrease day by day. Use your pain medication as needed. The narcotic medication will likely make you constipated so be somewhat careful about using this. Try to use the ibuprofen first and then the narcotic if you need more pain relief. Wean off of the narcotic within the first week of surgery to avoid problems with constipation and prolonged nausea. We generally do not refill the narcotic medication out of concerns that you will have side effects from prolonged use, therefore use it only as you need to.     Expect that you might have some vaginal bleeding, mostly spotting or small clots in the first several weeks after surgery. If the bleeding seems to be more than that, please contact our office and speak to Dr. King s RN Hiedi at [phone] .  No intercourse until your 6 week post-operative exam. 15 lbs lifting restriction for 6 weeks after your surgery.    Shower on post-op day 1. No baths/pools/hot tubs/lakes for minimum of 2 weeks.   No driving while taking narcotic pain medication, and no driving until you can safely lift your foot to hit the break.       When to contact your surgeon:  - Heavy vagial bleeding  - Fever >101  -Nausea/Vomiting  -Increasing pain not resolving from pain medication, rest, and icing.   -Signs of infection: Fever, redness at incision sites, or foul smelling vaginal dicharge  -Or anything other you feel important.     DISCHARGE INSTRUCTIONS FOR PATIENT   SCOPOLAMINE TRANSDERMAL PATCH  You may leave the patch on behind your ear for three days, but NO LONGER. You may have withdrawal symptoms (nausea, vomiting, headache, dizziness) if used longer.  When you remove the patch, you may wash and dry your hands thoroughly and before touching your eyes, as pupil may  dilate.  Discard patch (away from children and pets).  You may develop some urinary hesitancy or urine retention.    [facility]   Adult Discharge Orders & Instructions     For 24 hours after surgery    1. Get plenty of rest.  A responsible adult must stay with you for at least 24 hours after you leave the hospital.   2. Do not drive or use heavy equipment.  If you have weakness or tingling, don't drive or use heavy equipment until this feeling goes away.  3. Do not drink alcohol.  4. Avoid strenuous or risky activities.  Ask for help when climbing stairs.   5. You may feel lightheaded.  If so, sit for a few minutes before standing.  Have someone help you get up.   6. You may have a slight fever. Call the doctor if your fever is over 100 F (37.7 C) (taken under the tongue) or lasts longer than 24 hours.  7. You may have a dry mouth, a sore throat, muscle aches or trouble sleeping.  These should go away after 24 hours.  8. Do not make important or legal decisions.  We don t expect you to have any problems from the surgery or treatment you had today. Just in case, here s what to do if you have pain, upset stomach (nausea), bleeding or infection:  Pain:  Take medicines your doctor has prescribed or over-the-counter medicine they have suggested. Resting and using ice packs can help, too. For surgery on an arm or leg, raise it on a pillow to ease swelling. Call your doctor if these methods don t work.  Copyright [employer], Licensed under [employer]  Upset stomach (nausea):  Take anti-nausea medicine approved by your doctor. Drink clear liquids like apple juice, ginger ale, broth or 7-Up. Be sure to drink enough fluids. Rest can help, too. Move to normal foods when you re ready. Bleeding:  In the first 24 hours, you may see a little blood on your dressing, about the size of a quarter. You don t need to worry about this much blood, but if the blood spot keeps getting bigger:    Put  pressure on the wound if you can, AND    Call your doctor.  Copyright [employer], Licensed under [employer]  Fever/Infection: Please call your doctor if you have any of these signs:    Redness    Swelling    Wound feels warm    Pain gets worse    Bad-smelling fluid leaks from wound    Fever or chills  Call your doctor for any of the followin.  It has been over 8 to 10 hours since surgery and you are still not able to urinate (pass water).    Nurse advice line: [phone]

## 2019-02-01 NOTE — ANESTHESIA CARE TRANSFER NOTE
Patient: Anastacia Paez    Procedure(s):  TOTAL VAGINAL HYSTERECTOMY  BILATERAL SALPINGECTOMY  left Oophorectomy    Diagnosis: AUB, intolerance of hormones  Diagnosis Additional Information: No value filed.    Anesthesia Type:   General, ETT     Note:  Airway :Face Mask and Oral Airway  Patient transferred to:PACU  Handoff Report: Identifed the Patient, Identified the Reponsible Provider, Reviewed the pertinent medical history, Discussed the surgical course, Reviewed Intra-OP anesthesia mangement and issues during anesthesia, Set expectations for post-procedure period and Allowed opportunity for questions and acknowledgement of understanding      Vitals: (Last set prior to Anesthesia Care Transfer)    CRNA VITALS  2/1/2019 0814 - 2/1/2019 0849      2/1/2019             NIBP:  105/51    Pulse:  85    NIBP Mean:  65    SpO2:  98 %    Resp Rate (observed):  9                Electronically Signed By: JUDI Resendez CRNA  February 1, 2019  8:49 AM

## 2019-02-01 NOTE — ANESTHESIA PREPROCEDURE EVALUATION
Anesthesia Pre-Procedure Evaluation    Patient: Anastacia Paez   MRN: [id number] : 1980          Preoperative Diagnosis: AUB, intolerance of hormones    Procedure(s):  TOTAL VAGINAL HYSTERECTOMY  BILATERAL SALPINGECTOMY    Past Medical History:   Diagnosis Date     Abnormal Pap smear of cervix 2014    See problem list     Adjustment disorder with anxious mood 2015     Adjustment disorder with mixed anxiety and depressed mood 2015     Anxiety 2015     Bilateral low back pain without sciatica 2015     Dysmenorrhea 2015     Encounter for initial prescription of intrauterine contraceptive device 2015     Herpes simplex virus infection 2017    perioral      Panic attack 2015     Psoriasis      Seasonal allergic rhinitis 2015     Sun-damaged skin 10/19/2017     Past Surgical History:   Procedure Laterality Date     ABDOMINOPLASTY       APPENDECTOMY       C BREAST AUGMENTATION         Anesthesia Evaluation     . Pt has had prior anesthetic. Type: General and MAC           ROS/MED HX    ENT/Pulmonary:     (+)tobacco use, Past use , . .    Neurologic:  - neg neurologic ROS     Cardiovascular:  - neg cardiovascular ROS       METS/Exercise Tolerance:     Hematologic:  - neg hematologic  ROS       Musculoskeletal:  - neg musculoskeletal ROS       GI/Hepatic:  - neg GI/hepatic ROS       Renal/Genitourinary:  - ROS Renal section negative       Endo:  - neg endo ROS       Psychiatric: Comment: Panic disorder    (+) psychiatric history anxiety and depression      Infectious Disease:  - neg infectious disease ROS       Malignancy:      - no malignancy   Other:    - neg other ROS                      Physical Exam  Normal systems: cardiovascular, pulmonary and dental    Airway   Mallampati: I  TM distance: >3 FB  Neck ROM: full    Dental     Cardiovascular   Rhythm and rate: regular and normal      Pulmonary    breath sounds clear to  "auscultation            Lab Results   Component Value Date    WBC 5.4 01/07/2019    HGB 13.1 01/07/2019    HCT 37.9 01/07/2019     01/07/2019       Preop Vitals  BP Readings from Last 3 Encounters:   01/28/19 110/64   01/09/19 116/74   01/02/19 104/64    Pulse Readings from Last 3 Encounters:   01/28/19 68   01/09/19 80   01/02/19 68      Resp Readings from Last 3 Encounters:   01/28/19 18   11/06/18 16   07/02/18 20    SpO2 Readings from Last 3 Encounters:   07/02/18 97%   10/16/17 99%   07/26/16 98%      Temp Readings from Last 1 Encounters:   01/28/19 99.4  F (37.4  C) (Tympanic)    Ht Readings from Last 1 Encounters:   01/28/19 1.645 m (5' 4.75\")      Wt Readings from Last 1 Encounters:   01/28/19 65.8 kg (145 lb)    Estimated body mass index is 24.32 kg/m  as calculated from the following:    Height as of 1/28/19: 1.645 m (5' 4.75\").    Weight as of 1/28/19: 65.8 kg (145 lb).       Anesthesia Plan      History & Physical Review  History and physical reviewed and following examination; no interval change.    ASA Status:  2 .    NPO Status:  > 6 hours    Plan for General and ETT with Intravenous and Propofol induction. Maintenance will be Balanced.    PONV prophylaxis:  Ondansetron (or other 5HT-3) and Dexamethasone or Solumedrol       Postoperative Care  Postoperative pain management:  IV analgesics and Oral pain medications.      Consents  Anesthetic plan, risks, benefits and alternatives discussed with:  Patient.  Use of blood products discussed: No .   .                 JUDI Resendez CRNA  "

## 2019-02-04 LAB — COPATH REPORT: NORMAL

## 2019-02-04 NOTE — OR NURSING
[facility]  Discharge Call Back  Anastacia Paez  1980  MRN: [id number]  Home: [phone] (home)   PCP: Kathryn Renteria    We are calling to see how you're doing since your surgery/procedure with us?   Comments: I am doing good   Clinical Questions  1. Have you had time to look at your discharge instructions? Do you have any questions in regards to the instructions?   Comment: I have no questions   2. Do you feel your pain is being controlled with the regimen the surgeon sent you home on? (ie: prescription medications, over the counter pain medications, ice packs)   Comments: tolerable pain, just bloating   3. Have you noticed any drainage on your dressing? Do you know what to do if you have bleeding as a result of your procedure?   Comments: very minimal   4. Have you had any nausea/vomiting? Do you know how to treat this?   Comment: none  5. Have you had any signs/symptoms of infection? (ie: fever, swelling, heat, drainage or redness) Do you know what to do if you have?   Comment: none, I know what to look for   6. Do you have a follow up appointment made with your surgeon? Do you have a number to contact them at if you need it?   Comment: yes I do  Retained Foreign Object (SABRINA, Hemovac, Penrose, Wound Packing, Vaginal Packing, Nasal Splints, Urethral Stents, Mccain Catheter)  1. Do you still have NA in place?   2. If the item is still in place, can you review the plan for removal with me? NA  Recognition  Is there anyone from your care team that you would like to recognize?   Comments: everyone did good   Improvement  Our goal is to be the best. Do you have any suggestions for things that we could improve on?   Comments: nothing   You may be randomly selected to fill out a [facility] survey. We would appreciate you taking the time to fill this out. It is important to us if you would answer all of the questions on the survey.

## 2019-02-05 DIAGNOSIS — F41.9 ANXIETY: ICD-10-CM

## 2019-02-05 NOTE — TELEPHONE ENCOUNTER
Routing refill request to provider for review/approval because:  Drug not on the [facility] refill protocol  Rosemarie GARCIA RN

## 2019-02-05 NOTE — TELEPHONE ENCOUNTER
Controlled Substance Refill Request for ALPRAZolam (XANAX) 0.5 MG tablet  Problem List Complete:  Yes   checked in past 3 months?  No, route to RN      Last Written Prescription Date:  10/16/18  Last Fill Quantity: 90,  # refills: 1   Last office visit: 1/28/2019 with prescribing provider:     Future Office Visit:   Next 5 appointments (look out 90 days)    Feb 19, 2019  1:30 PM CST  SHORT with Richie King MD  [facility] [address] [place] [id number] [phone]

## 2019-02-06 RX ORDER — ALPRAZOLAM 0.5 MG
0.5 TABLET ORAL 3 TIMES DAILY PRN
Qty: 90 TABLET | Refills: 1 | Status: SHIPPED | OUTPATIENT
Start: 2019-02-06 | End: 2019-07-29

## 2019-02-18 ENCOUNTER — MYC MEDICAL ADVICE (OUTPATIENT)
Dept: OBGYN | Facility: CLINIC | Age: 39
End: 2019-02-18

## 2019-02-19 ENCOUNTER — OFFICE VISIT (OUTPATIENT)
Dept: OBGYN | Facility: CLINIC | Age: 39
End: 2019-02-19
Payer: COMMERCIAL

## 2019-02-19 VITALS
SYSTOLIC BLOOD PRESSURE: 102 MMHG | WEIGHT: 140 LBS | BODY MASS INDEX: 23.48 KG/M2 | DIASTOLIC BLOOD PRESSURE: 64 MMHG | HEART RATE: 88 BPM

## 2019-02-19 DIAGNOSIS — Z09 POSTOP CHECK: Primary | ICD-10-CM

## 2019-02-19 PROCEDURE — 99024 POSTOP FOLLOW-UP VISIT: CPT | Performed by: OBSTETRICS & GYNECOLOGY

## 2019-02-19 RX ORDER — TAMSULOSIN HYDROCHLORIDE 0.4 MG/1
0.4 CAPSULE ORAL 2 TIMES DAILY
COMMUNITY
Start: 2019-01-31 | End: 2019-05-07

## 2019-02-19 NOTE — PROGRESS NOTES
"Clinic Note    CC:  Postop check    HPI:  Anastacia Paez is a 38 year old  who is s/p TVH, BS, LO on 2/1 for menorrhagia, dyspareunia, and intolerance of hormones. She is overall doing great. She returned to work on POD#3, though \"taking it easy\" for the first week. She is now more active, she is not adhering to her surgical restrictions, admitted to laying sebastian the other day, also had sex with limited penetration. Some intermittent nagging discomfort in her RLQ at the pelvic brim. Appetite is still reduced postop. BM every day, soft, though lower volume, and some minimal vaginal spotting with BM, trying not to strain. She continues to have urethral pain, worse with orgasm. Urethral and bladder pain were noted on her initial evaluation by me, she is now being followed by urology, being treated for inflammatory urethral polyps. She was quite fatigued her first week postop, though energy level coming back this week. She also notes some lower back cramping present around the time she should have had her period.     PMH:   Past Medical History:   Diagnosis Date     Abnormal Pap smear of cervix 05/06/2014    See problem list     Adjustment disorder with anxious mood 11/17/2015     Adjustment disorder with mixed anxiety and depressed mood 11/17/2015     Anxiety 12/30/2015     Bilateral low back pain without sciatica 11/17/2015     Dysmenorrhea 11/17/2015     Encounter for initial prescription of intrauterine contraceptive device 11/17/2015     Herpes simplex virus infection 2/2/2017    perioral      Panic attack 11/17/2015     Psoriasis      Seasonal allergic rhinitis 11/17/2015     Sun-damaged skin 10/19/2017     SurgHx:   Past Surgical History:   Procedure Laterality Date     ABDOMINOPLASTY  2014     APPENDECTOMY  1992     C BREAST AUGMENTATION  2010     HYSTERECTOMY VAGINAL N/A 2/1/2019    Procedure: TOTAL VAGINAL HYSTERECTOMY;  Surgeon: Richie King MD;  [facility]     OOPHORECTOMY  2/1/2019    " Procedure: left Oophorectomy;  Surgeon: Richie King MD;  [facility]     SALPINGECTOMY Bilateral 2/1/2019    Procedure: BILATERAL SALPINGECTOMY;  Surgeon: Richie King MD;  Location: [facility]     FamHx:   Family History   Problem Relation Age of Onset     Heart Disease Father         CHF     Other - See Comments Father         lung transplant due to agent orange     Other - See Comments Daughter         issues with ears     SocHx:   Social History     Socioeconomic History     Marital status: Single     Spouse name: None     Number of children: None     Years of education: None     Highest education level: None   Social Needs     Financial resource strain: None     Food insecurity - worry: None     Food insecurity - inability: None     Transportation needs - medical: None     Transportation needs - non-medical: None   Occupational History     None   Tobacco Use     Smoking status: Former Smoker     Last attempt to quit: 1/1/2004     Years since quitting: 15.1     Smokeless tobacco: Never Used   Substance and Sexual Activity     Alcohol use: Yes     Frequency: Monthly or less     Comment: occasionally     Drug use: No     Sexual activity: Yes     Partners: Male     Birth control/protection: Male Surgical, Female Surgical   Other Topics Concern     Parent/sibling w/ CABG, MI or angioplasty before 65F 55M? No   Social History Narrative     None     Allergies:   Morphine    Current Medications:   Current Outpatient Medications   Medication Sig Dispense Refill     ALPRAZolam (XANAX) 0.5 MG tablet Take 1 tablet (0.5 mg) by mouth 3 times daily as needed for anxiety 90 tablet 1     augmented betamethasone dipropionate (DIPROLENE-AF) 0.05 % cream Apply to AA BID x 2-3 weeks then PRN 50 g 1     cetirizine (ZYRTEC) 10 MG tablet Take 10 mg by mouth daily       citalopram (CELEXA) 10 MG tablet Take 1 tablet (10 mg) by mouth daily 90 tablet 3     diclofenac (VOLTAREN) 1 % GEL As needed for back pain        lidocaine (XYLOCAINE) 2 % jelly Apply topically as needed for moderate pain Three time daily as needed       senna-docusate (SENOKOT-S/PERICOLACE) 8.6-50 MG tablet Take 1-2 tablets by mouth 2 times daily 120 tablet 0     tamsulosin (FLOMAX) 0.4 MG capsule 0.4 mg 2 times daily        valACYclovir (VALTREX) 1000 mg tablet Take 1 tablet (1,000 mg) by mouth daily 90 tablet 1       ROS: As described in HPI, otherwise negative for fever/chills, fatigue, dizziness, changes or new deficits in vision, weight changes, worrisome rashes, new lumps or masses, cough/SOB/CP, nausea/vomit, GI distress, dysuria, abnormal vaginal discharge, constipation/diarrhea, neurological deficits, changes in ADL, changes in skin or hair, heat or cold intolerance, or other systemic complaints    EXAM:  Vitals:    02/19/19 1333   BP: 102/64   BP Location: Right arm   Cuff Size: Adult Regular   Pulse: 88   Weight: 63.5 kg (140 lb)    Body mass index is 23.48 kg/m .    Gen: Alert, oriented, appropriately interactive, NAD  CV: RRR, 2+ peripheral pulses  Resp: CTAB, good effort without distress   Abdomen: soft, non tender, non distended, no masses, no hernias. No inguinal lymphadenopathy. No hepatosplenomegaly  Perineum: no lesions; normal appearing external genitalia  Vagina : no bleeding, masses, or lesions or discharge, normally rugated, cuff intact  Bimanual exam: Vagina non tender, cuff intact, no pelvic masses  Lower extremities: non-tender, no edema  Skin: no lesions or rashes    ASSESSMENT/PLAN: Anastacia Paez is a 38 year old  who is s/p TVH, BS, LO on 2/1 for menorrhagia, dyspareunia, and intolerance of hormones.    1. Postop check  - reiterated postop restriction including no heavy lifting, pushing, pulling for six weeks, no sex for six weeks, discussed risk of cuff dehiscence, possible bowel prolapse, possible need for bowel resection. Reminding patient she does need time to recover.   - regarding urethral pain during orgasm, discussed  that urethral muscles active with orgasm, likely irritating her polyps, should improve with treatment  - to return for six week postop    Richie King MD  2/19/2019 2:59 PM

## 2019-03-12 ENCOUNTER — OFFICE VISIT (OUTPATIENT)
Dept: OBGYN | Facility: CLINIC | Age: 39
End: 2019-03-12
Payer: COMMERCIAL

## 2019-03-12 VITALS — HEART RATE: 72 BPM | DIASTOLIC BLOOD PRESSURE: 58 MMHG | SYSTOLIC BLOOD PRESSURE: 110 MMHG

## 2019-03-12 DIAGNOSIS — Z98.890 POSTOPERATIVE STATE: Primary | ICD-10-CM

## 2019-03-12 PROCEDURE — 99024 POSTOP FOLLOW-UP VISIT: CPT | Performed by: OBSTETRICS & GYNECOLOGY

## 2019-03-12 NOTE — PROGRESS NOTES
"Clinic Note    HPI:  Anastacia Paez is a 38 year old  who is s/p TVH, BS, LO on 2/1 for menorrhagia, dyspareunia, and intolerance of hormones. She is overall doing great. She returned to work on POD#3, though \"taking it easy\" for the first week. She is now more or less back to normal, she did not adhering to her surgical restrictions, admitted to laying sebastian as well as having sex. Some intermittent nagging discomfort in her RLQ at the pelvic brim continues. Also recent limited vaginal spotting, now resolved. She is being followed by urology, being treated for inflammatory urethral polyps..     PMH:   Past Medical History:   Diagnosis Date     Abnormal Pap smear of cervix 05/06/2014    See problem list     Adjustment disorder with anxious mood 11/17/2015     Adjustment disorder with mixed anxiety and depressed mood 11/17/2015     Anxiety 12/30/2015     Bilateral low back pain without sciatica 11/17/2015     Dysmenorrhea 11/17/2015     Encounter for initial prescription of intrauterine contraceptive device 11/17/2015     Herpes simplex virus infection 2/2/2017    perioral      Panic attack 11/17/2015     Psoriasis      Seasonal allergic rhinitis 11/17/2015     Sun-damaged skin 10/19/2017     SurgHx:   Past Surgical History:   Procedure Laterality Date     ABDOMINOPLASTY  2014     APPENDECTOMY  1992     C BREAST AUGMENTATION  2010     HYSTERECTOMY VAGINAL N/A 2/1/2019    Procedure: TOTAL VAGINAL HYSTERECTOMY;  Surgeon: Ricihe King MD;  [facility]     OOPHORECTOMY  2/1/2019    Procedure: left Oophorectomy;  Surgeon: Richie King MD;  [facility]     SALPINGECTOMY Bilateral 2/1/2019    Procedure: BILATERAL SALPINGECTOMY;  Surgeon: Richie King MD;  [facility]     FamHx:   Family History   Problem Relation Age of Onset     Heart Disease Father         CHF     Other - See Comments Father         lung transplant due to agent orange     Other - See Comments Daughter         " issues with ears     SocHx:   Social History     Socioeconomic History     Marital status: Single     Spouse name: None     Number of children: None     Years of education: None     Highest education level: None   Occupational History     None   Social Needs     Financial resource strain: None     Food insecurity:     Worry: None     Inability: None     Transportation needs:     Medical: None     Non-medical: None   Tobacco Use     Smoking status: Former Smoker     Last attempt to quit: 1/1/2004     Years since quitting: 15.2     Smokeless tobacco: Never Used   Substance and Sexual Activity     Alcohol use: Yes     Frequency: Monthly or less     Comment: occasionally     Drug use: No     Sexual activity: Yes     Partners: Male     Birth control/protection: Male Surgical, Female Surgical   Lifestyle     Physical activity:     Days per week: None     Minutes per session: None     Stress: None   Relationships     Social connections:     Talks on phone: None     Gets together: None     Attends Evangelical service: None     Active member of club or organization: None     Attends meetings of clubs or organizations: None     Relationship status: None     Intimate partner violence:     Fear of current or ex partner: None     Emotionally abused: None     Physically abused: None     Forced sexual activity: None   Other Topics Concern     Parent/sibling w/ CABG, MI or angioplasty before 65F 55M? No   Social History Narrative     None     Allergies:   Morphine    Current Medications:   Current Outpatient Medications   Medication Sig Dispense Refill     ALPRAZolam (XANAX) 0.5 MG tablet Take 1 tablet (0.5 mg) by mouth 3 times daily as needed for anxiety 90 tablet 1     augmented betamethasone dipropionate (DIPROLENE-AF) 0.05 % cream Apply to AA BID x 2-3 weeks then PRN 50 g 1     cetirizine (ZYRTEC) 10 MG tablet Take 10 mg by mouth daily       citalopram (CELEXA) 10 MG tablet Take 1 tablet (10 mg) by mouth daily 90 tablet 3      diclofenac (VOLTAREN) 1 % GEL As needed for back pain       lidocaine (XYLOCAINE) 2 % jelly Apply topically as needed for moderate pain Three time daily as needed       tamsulosin (FLOMAX) 0.4 MG capsule 0.4 mg 2 times daily        valACYclovir (VALTREX) 1000 mg tablet Take 1 tablet (1,000 mg) by mouth daily 90 tablet 1     ROS: As described in HPI, otherwise negative for fever/chills, fatigue, dizziness, changes or new deficits in vision, weight changes, worrisome rashes, new lumps or masses, cough/SOB/CP, nausea/vomit, GI distress, dysuria, abnormal vaginal discharge, constipation/diarrhea, neurological deficits, changes in ADL, changes in skin or hair, heat or cold intolerance, or other systemic complaints    EXAM:  Vitals:    03/12/19 1220   BP: 110/58   BP Location: Right arm   Cuff Size: Adult Regular   Pulse: 72    There is no height or weight on file to calculate BMI.    Gen: Alert, oriented, appropriately interactive, NAD  CV: RRR, 2+ peripheral pulses  Resp: CTAB, good effort without distress   Abdomen: soft, non tender, non distended, no masses, no hernias. No inguinal lymphadenopathy. No hepatosplenomegaly  Perineum: no lesions; normal appearing external genitalia  Vagina : no bleeding, masses, or lesions or discharge, normally rugated, cuff intact  Bimanual exam: Vagina non tender, cuff intact, no pelvic masses  Lower extremities: non-tender, no edema  Skin: no lesions or rashes     ASSESSMENT/PLAN: Anastacia Paez is a 38 year old  who is s/p TVH, BS, LO on 2/1 for menorrhagia, dyspareunia, and intolerance of hormones.     1. Postop check  - Surgery site well healed, reassuring pelvic exam today, surgical restrictions lifted.     Richie King MD  3/12/2019 4:13 PM

## 2019-03-26 ENCOUNTER — OFFICE VISIT (OUTPATIENT)
Dept: AUDIOLOGY | Facility: CLINIC | Age: 39
End: 2019-03-26
Payer: COMMERCIAL

## 2019-03-26 DIAGNOSIS — H93.13 TINNITUS AURIUM, BILATERAL: Primary | ICD-10-CM

## 2019-03-26 PROCEDURE — 92550 TYMPANOMETRY & REFLEX THRESH: CPT | Performed by: AUDIOLOGIST

## 2019-03-26 PROCEDURE — 92557 COMPREHENSIVE HEARING TEST: CPT | Performed by: AUDIOLOGIST

## 2019-03-26 PROCEDURE — 99207 ZZC NO CHARGE LOS: CPT | Performed by: AUDIOLOGIST

## 2019-03-26 NOTE — PROGRESS NOTES
AUDIOLOGY REPORT    SUBJECTIVE:  Anastacia Paez is a 39 year old female who was seen in the Audiology Clinic at the [facility] for audiologic evaluation, self-referred. The patient reports difficulty hearing conversation, especially in noise worse recently, constant bilateral tinnitus, family history of hearing loss, personal history of otitis media as a child. They were accompanied today by their self.    OBJECTIVE:  Otoscopic exam indicates ears are clear of cerumen bilaterally     Pure Tone Thresholds assessed using conventional audiometry with good  reliability from 250-8000 Hz bilaterally using insert earphones     RIGHT:  normal hearing sensitivity     LEFT:    normal and borderline-normal sensitivity    Tympanogram:    RIGHT: normal eardrum mobility    LEFT:   normal eardrum mobility patient mentioned momentary dizziness for left tymp only    Reflexes (reported by stimulus ear):  RIGHT: Ipsilateral is present at normal levels  RIGHT: Contralateral is present at normal levels  LEFT:   Ipsilateral is present at normal levels  LEFT:   Contralateral is present at normal levels      Speech Reception Threshold:     RIGHT: 10 dB HL    LEFT:   5 dB HL  Word Recognition Score:     RIGHT: 100% at 50 dB HL using NU-6 recorded word list.    LEFT:   100% at 50 dB HL using NU-6 recorded word list.      ASSESSMENT:   Suspected hearing loss bilateral tinnitus.    Today s results were discussed with the patient in detail.     PLAN:  Patient was counseled regarding hearing loss and impact on communication. It is recommended that the patient follow up as needed for decreased hearing or increased tinnitus, not more than 3 years.  Please call this clinic with questions regarding these results or recommendations.        Da Lam Licensed Audiologist #[id number]

## 2019-05-04 DIAGNOSIS — N34.2 POLYPOID URETHRITIS: Primary | ICD-10-CM

## 2019-05-07 RX ORDER — TAMSULOSIN HYDROCHLORIDE 0.4 MG/1
0.4 CAPSULE ORAL 2 TIMES DAILY
Qty: 180 CAPSULE | Refills: 0 | Status: SHIPPED | OUTPATIENT
Start: 2019-05-07 | End: 2019-07-31

## 2019-05-07 NOTE — TELEPHONE ENCOUNTER
tamsulosin    Routing refill request to provider for review/approval because:  Medication is reported/historical    Shabana Singletary RN, BSN

## 2019-07-11 DIAGNOSIS — F41.9 ANXIETY: ICD-10-CM

## 2019-07-12 RX ORDER — CITALOPRAM HYDROBROMIDE 10 MG/1
TABLET ORAL
Qty: 90 TABLET | Refills: 1 | Status: SHIPPED | OUTPATIENT
Start: 2019-07-12 | End: 2020-06-12

## 2019-07-12 NOTE — TELEPHONE ENCOUNTER
"Requested Prescriptions   Pending Prescriptions Disp Refills     citalopram (CELEXA) 10 MG tablet [Pharmacy Med Name: CITALOPRAM HYDROBROMIDE 10MG TABS] 90 tablet 3     Sig: TAKE ONE TABLET BY MOUTH EVERY DAY       SSRIs Protocol Passed - 7/11/2019  3:56 PM        Passed - Recent (12 mo) or future (30 days) visit within the authorizing provider's specialty     Patient had office visit in the last 12 months or has a visit in the next 30 days with authorizing provider or within the authorizing provider's specialty.  See \"Patient Info\" tab in inbasket, or \"Choose Columns\" in Meds & Orders section of the refill encounter.              Passed - Medication is active on med list        Passed - Patient is age 18 or older        Passed - No active pregnancy on record        Passed - No positive pregnancy test in last 12 months        citalopram (CELEXA) 10 MG tablet  Last Written Prescription Date:  08/23/2018  Last Fill Quantity: 90 tablet,  # refills: 3   Last office visit: 1/28/2019 with prescribing provider:  JUVENAL Coffey   Future Office Visit:      PHQ-9 SCORE 4/12/2016 11/21/2017 7/30/2018   PHQ-9 Total Score 1 1 4     RAJ-7 SCORE 11/21/2017 7/30/2018   Total Score 2 5       Nya BARBOSA (R) (M)    "

## 2019-07-12 NOTE — TELEPHONE ENCOUNTER
Prescription approved per [facility] Refill Protocol.    Rosemarie GARCIA RN

## 2019-07-26 DIAGNOSIS — F41.9 ANXIETY: ICD-10-CM

## 2019-07-26 NOTE — TELEPHONE ENCOUNTER
Controlled Substance Refill Request for ALPRAZolam (XANAX) 0.5 MG tablet  Problem List Complete:  Yes   checked in past 3 months?  No, route to RN     Last Written Prescription Date:  2/6/19  Last Fill Quantity: 90,  # refills: 1   Last office visit: 1/28/2019 with prescribing provider:     Future Office Visit:

## 2019-07-29 RX ORDER — ALPRAZOLAM 0.5 MG
0.5 TABLET ORAL 3 TIMES DAILY PRN
Qty: 90 TABLET | Refills: 1 | Status: SHIPPED | OUTPATIENT
Start: 2019-07-29

## 2019-09-30 ENCOUNTER — HEALTH MAINTENANCE LETTER (OUTPATIENT)
Age: 39
End: 2019-09-30

## 2019-11-25 DIAGNOSIS — N34.2 POLYPOID URETHRITIS: ICD-10-CM

## 2019-11-25 RX ORDER — TAMSULOSIN HYDROCHLORIDE 0.4 MG/1
CAPSULE ORAL
Qty: 180 CAPSULE | Refills: 0 | Status: SHIPPED | OUTPATIENT
Start: 2019-11-25 | End: 2020-05-06

## 2019-11-25 NOTE — TELEPHONE ENCOUNTER
"Requested Prescriptions   Pending Prescriptions Disp Refills     tamsulosin (FLOMAX) 0.4 MG capsule [Pharmacy Med Name: TAMSULOSIN HCL 0.4MG CAPS] 180 capsule 0     Sig: TAKE ONE CAPSULE BY MOUTH TWICE A DAY       Alpha Blockers Passed - 11/25/2019  6:44 AM        Passed - Blood pressure under 140/90 in past 12 months     BP Readings from Last 3 Encounters:   03/12/19 110/58   02/19/19 102/64   02/01/19 98/49                 Passed - Recent (12 mo) or future (30 days) visit within the authorizing provider's specialty     Patient has had an office visit with the authorizing provider or a provider within the authorizing providers department within the previous 12 mos or has a future within next 30 days. See \"Patient Info\" tab in inbasket, or \"Choose Columns\" in Meds & Orders section of the refill encounter.              Passed - Patient does not have Tadalafil, Vardenafil, or Sildenafil on their medication list        Passed - Medication is active on med list        Passed - Patient is 18 years of age or older        Passed - No active pregnancy on record        Passed - No positive pregnancy test in past 12 months        Last Written Prescription Date:  7/31/19  Last Fill Quantity: 180,  # refills: 0   Last office visit: 1/28/2019 with prescribing provider:     Future Office Visit:      "

## 2020-03-06 ENCOUNTER — OFFICE VISIT (OUTPATIENT)
Dept: FAMILY MEDICINE | Facility: CLINIC | Age: 40
End: 2020-03-06
Payer: COMMERCIAL

## 2020-03-06 VITALS
BODY MASS INDEX: 24.32 KG/M2 | OXYGEN SATURATION: 98 % | HEART RATE: 81 BPM | SYSTOLIC BLOOD PRESSURE: 98 MMHG | WEIGHT: 145 LBS | DIASTOLIC BLOOD PRESSURE: 62 MMHG | TEMPERATURE: 98.3 F

## 2020-03-06 DIAGNOSIS — R10.31 ABDOMINAL PAIN, RIGHT LOWER QUADRANT: Primary | ICD-10-CM

## 2020-03-06 PROCEDURE — 99214 OFFICE O/P EST MOD 30 MIN: CPT | Performed by: NURSE PRACTITIONER

## 2020-03-06 NOTE — PROGRESS NOTES
"Subjective     Anastacia Paez is a 39 year old female who presents to clinic today for the following health issues: the patient with past medical history of multiple abdominal surgeries including \"tummy tuck\", hysterectomy, appendectomy, complains of chronic right lower abdominal pain.     HPI   Abdominal Pain      Duration: 9 months, has gotten worse in the past 4-5 months, last month has been severe.     Description (location/character/radiation): RLQ pain- constant nagging dull ache and cramping. Cramping wakes her up intermittently.  Has also been constipated.        Associated flank pain: None    Intensity:  moderate, severe    Accompanying signs and symptoms:        Fever/Chills: no        Gas/Bloating: YES- bloating        Nausea/vomitting: YES- both has had 3 incidents where she vomited and felt nauseous, Pain was severe when she had this.        Diarrhea: no        Dysuria or Hematuria: no- has urinary retention and is on Flomax     History (previous similar pain/trauma/previous testing): Ultrasound in 2019     Precipitating or alleviating factors:       Pain worse with eating/BM/urination: none        Pain relieved by BM: YES     Therapies tried and outcome: Laxatives for constipation, and tylenol for when the pain is really bad, Has PEMF- she does this at home- states this is helpful     LMP:  not applicable      Reviewed and updated as needed this visit by Provider         Review of Systems   ROS COMP: Constitutional, HEENT, cardiovascular, pulmonary, gi and gu systems are negative, except as otherwise noted.      Objective    BP 98/62 (BP Location: Right arm, Patient Position: Sitting, Cuff Size: Adult Regular)   Pulse 81   Temp 98.3  F (36.8  C) (Tympanic)   Wt 65.8 kg (145 lb)   LMP 01/07/2019   SpO2 98%   BMI 24.32 kg/m    Body mass index is 24.32 kg/m .  Physical Exam   GENERAL: healthy, alert and no distress  ABDOMEN: tenderness RLQ, no organomegaly or masses, no palpable or pulsatile masses " and well-healed incision   SKIN: no suspicious lesions or rashes  NEURO: Normal strength and tone, mentation intact and speech normal  PSYCH: mentation appears normal, affect normal/bright    Diagnostic Test Results:  Labs reviewed in Epic        Assessment & Plan     1. Abdominal pain, right lower quadrant  -the patient have concerns about possible postoperative adhesions   -recommended abdominal CT to rule out other possible etiologies  -if abdominal CT scan normal recommend to see general surgeon for consult   -recommended to start Metamucil for constipation   - CT Abdomen Pelvis w Contrast; Future       See Patient Instructions      JUDI Villegas [facility]

## 2020-03-17 ENCOUNTER — HOSPITAL ENCOUNTER (OUTPATIENT)
Dept: CT IMAGING | Facility: CLINIC | Age: 40
Discharge: HOME | End: 2020-03-17
Attending: NURSE PRACTITIONER | Admitting: NURSE PRACTITIONER
Payer: COMMERCIAL

## 2020-03-17 DIAGNOSIS — R10.31 ABDOMINAL PAIN, RIGHT LOWER QUADRANT: ICD-10-CM

## 2020-03-17 PROCEDURE — 25000128 H RX IP 250 OP 636: Performed by: NURSE PRACTITIONER

## 2020-03-17 PROCEDURE — 25000125 ZZHC RX 250: Performed by: NURSE PRACTITIONER

## 2020-03-17 PROCEDURE — 74177 CT ABD & PELVIS W/CONTRAST: CPT

## 2020-03-17 RX ORDER — IOPAMIDOL 755 MG/ML
70 INJECTION, SOLUTION INTRAVASCULAR ONCE
Status: COMPLETED | OUTPATIENT
Start: 2020-03-17 | End: 2020-03-17

## 2020-03-17 RX ADMIN — SODIUM CHLORIDE 58 ML: 9 INJECTION, SOLUTION INTRAVENOUS at 09:45

## 2020-03-17 RX ADMIN — IOPAMIDOL 70 ML: 755 INJECTION, SOLUTION INTRAVENOUS at 09:45

## 2020-05-06 DIAGNOSIS — N34.2 POLYPOID URETHRITIS: ICD-10-CM

## 2020-05-06 RX ORDER — TAMSULOSIN HYDROCHLORIDE 0.4 MG/1
CAPSULE ORAL
Qty: 60 CAPSULE | Refills: 0 | Status: SHIPPED | OUTPATIENT
Start: 2020-05-06 | End: 2020-06-12

## 2020-06-12 ENCOUNTER — VIRTUAL VISIT (OUTPATIENT)
Dept: FAMILY MEDICINE | Facility: CLINIC | Age: 40
End: 2020-06-12
Payer: COMMERCIAL

## 2020-06-12 DIAGNOSIS — R10.31 RIGHT LOWER QUADRANT PAIN: Primary | ICD-10-CM

## 2020-06-12 DIAGNOSIS — N34.2 POLYPOID URETHRITIS: ICD-10-CM

## 2020-06-12 DIAGNOSIS — K59.00 CONSTIPATION, UNSPECIFIED CONSTIPATION TYPE: ICD-10-CM

## 2020-06-12 PROCEDURE — 99214 OFFICE O/P EST MOD 30 MIN: CPT | Mod: 95 | Performed by: NURSE PRACTITIONER

## 2020-06-12 RX ORDER — TAMSULOSIN HYDROCHLORIDE 0.4 MG/1
0.8 CAPSULE ORAL DAILY
Qty: 180 CAPSULE | Refills: 3 | Status: SHIPPED | OUTPATIENT
Start: 2020-06-12 | End: 2021-11-05

## 2020-06-12 RX ORDER — POLYETHYLENE GLYCOL 3350 17 G/17G
1 POWDER, FOR SOLUTION ORAL DAILY
COMMUNITY
End: 2021-11-05

## 2020-06-12 NOTE — PROGRESS NOTES
"Anastacia Paez is a 40 year old female who is being evaluated via a billable video visit.      The patient has been notified of following:     \"This video visit will be conducted via a call between you and your physician/provider. We have found that certain health care needs can be provided without the need for an in-person physical exam.  This service lets us provide the care you need with a video conversation.  If a prescription is necessary we can send it directly to your pharmacy.  If lab work is needed we can place an order for that and you can then stop by our lab to have the test done at a later time.    Video visits are billed at different rates depending on your insurance coverage.  Please reach out to your insurance provider with any questions.    If during the course of the call the physician/provider feels a video visit is not appropriate, you will not be charged for this service.\"    Patient has given verbal consent for Video visit? Yes    Will anyone else be joining your video visit?     Subjective     Anastacia Paez is a 40 year old female who presents today via video visit for the following health issues:    HPI  Medication Followup of Tamsulosin    Taking Medication as prescribed: yes    Side Effects:  None    Medication Helping Symptoms:  YES-has been out for a few days     Has seen urology and had cystoscopy -Dr. Hurt in [place]    Would like to discuss a referral for Gastroenterology-had a CT in [place] that was negative  Continues to have right lower quadrant pain and constipation-MiraLax helps only a little-feels constricted in that area  Multiple surgeries in that area    Video Start Time: 0848      Patient Active Problem List   Diagnosis     Seasonal allergic rhinitis     Encounter for initial prescription of intrauterine contraceptive device     Panic attack     Bilateral low back pain without sciatica     Dysmenorrhea     Anxiety     Herpes simplex virus infection     Sun-damaged skin     " Lichen simplex chronicus     Past Surgical History:   Procedure Laterality Date     ABDOMINOPLASTY  2014     APPENDECTOMY  1992     C BREAST AUGMENTATION  2010     HYSTERECTOMY VAGINAL N/A 2019    Procedure: TOTAL VAGINAL HYSTERECTOMY;  Surgeon: Richie King MD;  Location: [facility]     OOPHORECTOMY  2019    Procedure: left Oophorectomy;  Surgeon: Richie King MD;  Location: [facility]     SALPINGECTOMY Bilateral 2019    Procedure: BILATERAL SALPINGECTOMY;  Surgeon: Richie King MD;  Location: [facility]       Social History     Tobacco Use     Smoking status: Former Smoker     Last attempt to quit: 2004     Years since quittin.4     Smokeless tobacco: Never Used   Substance Use Topics     Alcohol use: Yes     Frequency: Monthly or less     Comment: occasionally     Family History   Problem Relation Age of Onset     Heart Disease Father         CHF     Other - See Comments Father         lung transplant due to agent orange     Other - See Comments Daughter         issues with ears         Current Outpatient Medications   Medication Sig Dispense Refill     ALPRAZolam (XANAX) 0.5 MG tablet Take 1 tablet (0.5 mg) by mouth 3 times daily as needed for anxiety 90 tablet 1     augmented betamethasone dipropionate (DIPROLENE-AF) 0.05 % cream Apply to AA BID x 2-3 weeks then PRN 50 g 1     cetirizine (ZYRTEC) 10 MG tablet Take 10 mg by mouth daily       diclofenac (VOLTAREN) 1 % GEL As needed for back pain       lidocaine (XYLOCAINE) 2 % jelly Apply topically as needed for moderate pain Three time daily as needed       polyethylene glycol (MIRALAX) 17 GM/SCOOP powder Take 1 capful by mouth daily       tamsulosin (FLOMAX) 0.4 MG capsule Take 2 capsules (0.8 mg) by mouth daily 180 capsule 3     valACYclovir (VALTREX) 1000 mg tablet Take 1 tablet (1,000 mg) by mouth daily 90 tablet 1     Allergies   Allergen Reactions     Morphine Itching       Reviewed and updated as needed this visit by  "Provider  Tobacco  Allergies  Meds  Problems  Med Hx  Surg Hx  Fam Hx         Review of Systems   Constitutional, HEENT, cardiovascular, pulmonary, gi and gu systems are negative, except as otherwise noted.      Objective    LMP 01/07/2019   Estimated body mass index is 24.32 kg/m  as calculated from the following:    Height as of 1/28/19: 1.645 m (5' 4.75\").    Weight as of 3/6/20: 65.8 kg (145 lb).  Physical Exam     GENERAL: Healthy, alert and no distress  EYES: Eyes grossly normal to inspection.  No discharge or erythema, or obvious scleral/conjunctival abnormalities.  RESP: No audible wheeze, cough, or visible cyanosis.  No visible retractions or increased work of breathing.    SKIN: Visible skin clear. No significant rash, abnormal pigmentation or lesions.  NEURO: Cranial nerves grossly intact.  Mentation and speech appropriate for age.  PSYCH: Mentation appears normal, affect normal/bright, judgement and insight intact, normal speech and appearance well-groomed.      Diagnostic Test Results:  Labs reviewed in Epic        Assessment & Plan     1. Right lower quadrant pain  With ongoing symptoms will refer to GI for further evaluation and plan.  - GASTROENTEROLOGY ADULT REF CONSULT ONLY; Future    2. Constipation, unspecified constipation type  Per above.  - GASTROENTEROLOGY ADULT REF CONSULT ONLY; Future    3. Polypoid urethritis  Flomax refilled.  - tamsulosin (FLOMAX) 0.4 MG capsule; Take 2 capsules (0.8 mg) by mouth daily  Dispense: 180 capsule; Refill: 3    Home care instructions were reviewed with the patient. The risks, benefits and treatment options of prescribed medications or other treatments have been discussed with the patient. The patient verbalized their understanding and should call or follow up if no improvement or if they develop further problems.     Return in about 1 year (around 6/12/2021) for Routine Visit.    JUDI Hamilton [facility]      Video-Visit " Details    Type of service:  Video Visit    Video End Time:8:58 AM    Originating Location (pt. Location): Home    Distant Location (provider location):  [facility]     Platform used for Video Visit: Angelique Reed in about 1 year (around 6/12/2021) for Routine Visit.       JUDI Hamilton CNP

## 2021-01-15 ENCOUNTER — HEALTH MAINTENANCE LETTER (OUTPATIENT)
Age: 41
End: 2021-01-15

## 2021-10-24 ENCOUNTER — HEALTH MAINTENANCE LETTER (OUTPATIENT)
Age: 41
End: 2021-10-24

## 2021-11-05 ENCOUNTER — VIRTUAL VISIT (OUTPATIENT)
Dept: FAMILY MEDICINE | Facility: OTHER | Age: 41
End: 2021-11-05
Attending: NURSE PRACTITIONER
Payer: COMMERCIAL

## 2021-11-05 DIAGNOSIS — B00.9 HERPES SIMPLEX VIRUS INFECTION: ICD-10-CM

## 2021-11-05 DIAGNOSIS — N34.2 POLYPOID URETHRITIS: ICD-10-CM

## 2021-11-05 DIAGNOSIS — F98.8 ATTENTION DEFICIT DISORDER (ADD) WITHOUT HYPERACTIVITY: ICD-10-CM

## 2021-11-05 PROBLEM — Z90.710 H/O VAGINAL HYSTERECTOMY: Status: ACTIVE | Noted: 2019-02-01

## 2021-11-05 PROBLEM — Z90.710 H/O VAGINAL HYSTERECTOMY: Status: RESOLVED | Noted: 2019-02-01 | Resolved: 2021-11-05

## 2021-11-05 PROCEDURE — 99213 OFFICE O/P EST LOW 20 MIN: CPT | Mod: 95 | Performed by: NURSE PRACTITIONER

## 2021-11-05 RX ORDER — DEXTROAMPHETAMINE SACCHARATE, AMPHETAMINE ASPARTATE MONOHYDRATE, DEXTROAMPHETAMINE SULFATE AND AMPHETAMINE SULFATE 5; 5; 5; 5 MG/1; MG/1; MG/1; MG/1
20 CAPSULE, EXTENDED RELEASE ORAL DAILY
Qty: 30 CAPSULE | Refills: 0 | Status: SHIPPED | OUTPATIENT
Start: 2021-12-06 | End: 2022-01-05

## 2021-11-05 RX ORDER — TAMSULOSIN HYDROCHLORIDE 0.4 MG/1
0.8 CAPSULE ORAL DAILY
Qty: 180 CAPSULE | Refills: 3 | Status: SHIPPED | OUTPATIENT
Start: 2021-11-05 | End: 2022-11-29

## 2021-11-05 RX ORDER — DEXTROAMPHETAMINE SACCHARATE, AMPHETAMINE ASPARTATE MONOHYDRATE, DEXTROAMPHETAMINE SULFATE AND AMPHETAMINE SULFATE 5; 5; 5; 5 MG/1; MG/1; MG/1; MG/1
20 CAPSULE, EXTENDED RELEASE ORAL DAILY
Qty: 30 CAPSULE | Refills: 0 | Status: SHIPPED | OUTPATIENT
Start: 2022-01-06 | End: 2022-02-05

## 2021-11-05 RX ORDER — VALACYCLOVIR HYDROCHLORIDE 1 G/1
TABLET, FILM COATED ORAL
Qty: 30 TABLET | Refills: 1 | Status: SHIPPED | OUTPATIENT
Start: 2021-11-05

## 2021-11-05 RX ORDER — DEXTROAMPHETAMINE SACCHARATE, AMPHETAMINE ASPARTATE MONOHYDRATE, DEXTROAMPHETAMINE SULFATE AND AMPHETAMINE SULFATE 5; 5; 5; 5 MG/1; MG/1; MG/1; MG/1
20 CAPSULE, EXTENDED RELEASE ORAL DAILY
Qty: 30 CAPSULE | Refills: 0 | Status: SHIPPED | OUTPATIENT
Start: 2021-11-05 | End: 2021-12-05

## 2021-11-05 NOTE — PATIENT INSTRUCTIONS
Assessment & Plan        Herpes simplex virus infection  - valACYclovir (VALTREX) 1000 mg tablet; 1 po BID for 3 days    Polypoid urethritis  - tamsulosin (FLOMAX) 0.4 MG capsule; Take 2 capsules (0.8 mg) by mouth daily    Attention deficit disorder (ADD) without hyperactivity  - amphetamine-dextroamphetamine (ADDERALL XR) 20 MG 24 hr capsule; Take 1 capsule (20 mg) by mouth daily  - amphetamine-dextroamphetamine (ADDERALL XR) 20 MG 24 hr capsule; Take 1 capsule (20 mg) by mouth daily  - amphetamine-dextroamphetamine (ADDERALL XR) 20 MG 24 hr capsule; Take 1 capsule (20 mg) by mouth daily      Follow-up 1 month      Arianne Garrett, CNP

## 2021-11-05 NOTE — PROGRESS NOTES
Anastacia is a 41 year old who is being evaluated via a billable telephone visit.      What phone number would you like to be contacted at? [phone]   How would you like to obtain your AVS? MyChart      Assessment & Plan        Herpes simplex virus infection  - valACYclovir (VALTREX) 1000 mg tablet; 1 po BID for 3 days    Polypoid urethritis  - tamsulosin (FLOMAX) 0.4 MG capsule; Take 2 capsules (0.8 mg) by mouth daily    Attention deficit disorder (ADD) without hyperactivity  - amphetamine-dextroamphetamine (ADDERALL XR) 20 MG 24 hr capsule; Take 1 capsule (20 mg) by mouth daily  - amphetamine-dextroamphetamine (ADDERALL XR) 20 MG 24 hr capsule; Take 1 capsule (20 mg) by mouth daily  - amphetamine-dextroamphetamine (ADDERALL XR) 20 MG 24 hr capsule; Take 1 capsule (20 mg) by mouth daily      Follow-up 1 month      Arianne Garrett, SAVANNAH  [facility] - SHAZIA Galaviz is a 41 year old who presents for the following health issues         ADD    Onset: years     Description:   Easily distracted: YES  Short attention span: YES  Trouble following directions: no   Impulsive behavior: YES   Trouble completing tasks: YES    Accompanying Signs & Symptoms:        Change in sleep pattern: no  Irritability at certain times of the day: no  Socially withdrawn: no  Depression symptoms: no  Anxiety symptoms: yes    History:  Caffeine intake: Small  Loss of appetite: no  Healthy diet: YES  Did you have problems in school or with previous employment: YES  Family history of ADD: YES  Have you had an evaluation for ADD in the past: yes  Do you use alcohol or drugs: no      OAB - needs Flomax refilled    Cold sores - needs Valtrex refilled.       Review of Systems   Constitutional, HEENT, cardiovascular, pulmonary, gi and gu systems are negative, except as otherwise noted.        Objective       Vitals:  No vitals were obtained today due to virtual visit.    Physical Exam   healthy, alert and no distress  PSYCH: Alert and  oriented times 3; coherent speech, normal   rate and volume, able to articulate logical thoughts, able   to abstract reason, no tangential thoughts, no hallucinations   or delusions  Her affect is normal  RESP: No cough, no audible wheezing, able to talk in full sentences  Remainder of exam unable to be completed due to telephone visits            Phone call duration: 15  minutes

## 2022-02-13 ENCOUNTER — HEALTH MAINTENANCE LETTER (OUTPATIENT)
Age: 42
End: 2022-02-13

## 2022-10-15 ENCOUNTER — HEALTH MAINTENANCE LETTER (OUTPATIENT)
Age: 42
End: 2022-10-15

## 2022-11-28 DIAGNOSIS — N34.2 POLYPOID URETHRITIS: ICD-10-CM

## 2022-11-28 NOTE — TELEPHONE ENCOUNTER
Flomax       Last Written Prescription Date:  11/5/21  Last Fill Quantity: 180,   # refills: 3  Last Office Visit: 11/5/21  Future Office visit:

## 2022-11-29 RX ORDER — TAMSULOSIN HYDROCHLORIDE 0.4 MG/1
CAPSULE ORAL
Qty: 180 CAPSULE | Refills: 3 | Status: SHIPPED | OUTPATIENT
Start: 2022-11-29 | End: 2023-09-18

## 2023-03-26 ENCOUNTER — HEALTH MAINTENANCE LETTER (OUTPATIENT)
Age: 43
End: 2023-03-26

## 2023-09-15 DIAGNOSIS — N34.2 POLYPOID URETHRITIS: ICD-10-CM

## 2023-09-18 RX ORDER — TAMSULOSIN HYDROCHLORIDE 0.4 MG/1
CAPSULE ORAL
Qty: 180 CAPSULE | Refills: 0 | Status: SHIPPED | OUTPATIENT
Start: 2023-09-18

## 2023-09-18 NOTE — TELEPHONE ENCOUNTER
tamsulosin (FLOMAX) 0.4 MG capsule 180 capsule 3 11/29/2022     Last Office Visit: 11/5/21     Future Office visit:       Routing refill request to provider for review/approval because:

## 2024-03-17 ENCOUNTER — HEALTH MAINTENANCE LETTER (OUTPATIENT)
Age: 44
End: 2024-03-17

## 2024-04-05 NOTE — TELEPHONE ENCOUNTER
Results annotated    Valtrex  Routing refill request to provider for review/approval because:  Labs not current:  Creatinine    Ada Cai, RN, BSN

## 2024-05-26 ENCOUNTER — HEALTH MAINTENANCE LETTER (OUTPATIENT)
Age: 44
End: 2024-05-26

## 2025-02-07 NOTE — PATIENT INSTRUCTIONS
You will be contacted in 2-3 business days for results of your lab tests.  No obvious abnormal signs on exam today to explain the painful intercourse.    However, due to your description of deep seated pain during intercourse a pelvic ultrasound can be helpful in ruling out possible causes. To schedule the ultrasound, call [phone].    Further recommendation to be given to you today.    You preferred to have your pap done when it's due, which is on or after August 2018.        Thank you for choosing [facility].  You may be receiving a survey in the mail from [employer] regarding your visit today.  Please take a few minutes to complete and return the survey to let us know how we are doing.      If you have questions or concerns, please contact us via [employer] or you can contact your care team at [phone].    Our Clinic hours are:  Monday 6:40 am  to 7:00 pm  Tuesday -Friday 6:40 am to 5:00 pm    The [place] outpatient lab hours are:  Monday - Friday 6:10 am to 4:45 pm  Saturdays 7:00 am to 11:00 am  Appointments are required, call [phone]    If you have clinical questions after hours or would like to schedule an appointment,  call the clinic at [phone].    Pelvic Pain, Uncertain Cause    Pelvic pain is pain felt in the lowest part of the belly (abdomen) and between the hipbones. The pain may occur suddenly and recently (acute). Or the pain may last for 6 months or longer (chronic).  There are many possible causes of pelvic pain. The pain may be due to a problem in the female reproductive system. Or, it may be due to a problem in the digestive, urinary, or musculoskeletal systems.  Based on your visit today, the exact cause of your pelvic pain is not certain. Your condition does not appear to be serious at this time. But it is important for you to keep watching for any new symptoms or worsening of your condition.  General care  Your healthcare provider may advise a number of ways to help manage  your pain. These can include:    Taking over-the-counter pain medicine. Stronger pain medicine may also be prescribed, if needed.    Applying heat to the pelvic area. Use a heating pad or a hot pack. Taking a hot bath may also help.    Getting plenty of rest.    Making certain lifestyle changes. These can include practicing good posture and getting regular exercise. Studies have shown that these changes help reduce pelvic pain in some women.    Seeing a physical therapist or pain specialist. These healthcare providers can discuss other ways to manage pain with you.  Follow-up care  Follow up with your healthcare provider, or as advised.   When to seek medical advice  Call your healthcare provider right away if any of the following occur:    Fever of 100.4 F or higher, or as directed by your healthcare provider    Pain worsens or you have sudden, severe pain or new pain    Nausea, vomiting, sweating, or restlessness    Dizziness or fainting    Unusual vaginal discharge    Abnormal vaginal bleeding (especially bleeding after menopause)  Date Last Reviewed: 10/1/2017    8799-4533 [employer]. [address] [place] [address], [place] [address]. All rights reserved. This information is not intended as a substitute for professional medical care. Always follow your healthcare professional's instructions.         DISPLAY PLAN FREE TEXT DISPLAY PLAN FREE TEXT DISPLAY PLAN FREE TEXT DISPLAY PLAN FREE TEXT

## (undated) DEVICE — SUCTION TIP YANKAUER W/O VENT BULBOUS TIP

## (undated) DEVICE — SU VICRYL 0 CT-1 CR 8X27" JJ31G

## (undated) DEVICE — PACK LITHOTOMY CUSTOM

## (undated) DEVICE — CATH TRAY FOLEY 16FR SIL

## (undated) DEVICE — GLOVE PROTEXIS BLUE W/NEU-THERA 8.0  2D73EB80

## (undated) DEVICE — ESU PENCIL W/HOLSTER

## (undated) DEVICE — PREP POVIDONE IODINE SOLUTION 10% 120ML

## (undated) DEVICE — SPONGE RAY-TEC 4X8" 7318

## (undated) DEVICE — SPONGE LAP 18X18" 1515

## (undated) DEVICE — SOL NACL 0.9% IRRIG 1000ML BOTTLE 07138-09

## (undated) DEVICE — TUBING SUCTION 12"X1/4" N612

## (undated) DEVICE — PREP SKIN SCRUB TRAY 4461A

## (undated) DEVICE — PACK LAPAROSCOPY/PELVISCOPY STD

## (undated) DEVICE — ESU ELEC BLADE 6" COATED E1450-6

## (undated) DEVICE — SUCTION TIP POOLE K770

## (undated) DEVICE — PREP POVIDONE IODINE SCRUB 7.5% 120ML

## (undated) DEVICE — SU VICRYL 3-0 PS-2 27" UND J427H

## (undated) DEVICE — GOWN IMPERVIOUS BREATHABLE 2XL/XLONG

## (undated) DEVICE — SU VICRYL 2-0 CT-1 27" UND J259H

## (undated) DEVICE — SU VICRYL 0 CT-1 36" J346H

## (undated) DEVICE — SU VICRYL 3-0 CT-1 36" J338H

## (undated) DEVICE — SOL WATER IRRIG 1000ML BOTTLE 07139-09

## (undated) DEVICE — PAD PERI INDIV WRAP 11" 2022A

## (undated) DEVICE — DRSG TEGADERM 6X8" 1628

## (undated) DEVICE — SU VICRYL 0 TIE 12X18" J906G

## (undated) DEVICE — SU VICRYL 3-0 SH 27" UND J416H

## (undated) DEVICE — DRAPE GYN/UROLOGY FLUID POUCH TUR 29455

## (undated) DEVICE — NDL SPINAL 22GA 3.5" QUINCKE 405181

## (undated) DEVICE — ENDO DISSECTOR BLUNT 05MM  BTD05

## (undated) DEVICE — DRSG TELFA 3X8" 1238

## (undated) DEVICE — ESU LIGASURE IMPACT OPEN SEALER/DVDR CVD LG JAW 36MM LF4318

## (undated) DEVICE — ESU CLEANER TIP 31142717

## (undated) DEVICE — GLOVE PROTEXIS W/NEU-THERA 8.0  2D73TE80

## (undated) DEVICE — CATH TRAY FOLEY 16FR DRAINAGE BAG STATLOCK 899916

## (undated) DEVICE — BLADE KNIFE SURG 15 371115

## (undated) DEVICE — NDL COUNTER 40CT

## (undated) DEVICE — BLADE KNIFE SURG 10 371110

## (undated) RX ORDER — FENTANYL CITRATE 50 UG/ML
INJECTION, SOLUTION INTRAMUSCULAR; INTRAVENOUS
Status: DISPENSED
Start: 2019-02-01

## (undated) RX ORDER — BUPIVACAINE HYDROCHLORIDE AND EPINEPHRINE 2.5; 5 MG/ML; UG/ML
INJECTION, SOLUTION EPIDURAL; INFILTRATION; INTRACAUDAL; PERINEURAL
Status: DISPENSED
Start: 2019-02-01

## (undated) RX ORDER — GLYCOPYRROLATE 0.2 MG/ML
INJECTION INTRAMUSCULAR; INTRAVENOUS
Status: DISPENSED
Start: 2019-02-01

## (undated) RX ORDER — LIDOCAINE HYDROCHLORIDE 20 MG/ML
INJECTION, SOLUTION EPIDURAL; INFILTRATION; INTRACAUDAL; PERINEURAL
Status: DISPENSED
Start: 2019-02-01

## (undated) RX ORDER — ONDANSETRON 2 MG/ML
INJECTION INTRAMUSCULAR; INTRAVENOUS
Status: DISPENSED
Start: 2019-02-01

## (undated) RX ORDER — DEXAMETHASONE SODIUM PHOSPHATE 10 MG/ML
INJECTION INTRAMUSCULAR; INTRAVENOUS
Status: DISPENSED
Start: 2019-02-01

## (undated) RX ORDER — PROPOFOL 10 MG/ML
INJECTION, EMULSION INTRAVENOUS
Status: DISPENSED
Start: 2019-02-01

## (undated) RX ORDER — HYDROMORPHONE HYDROCHLORIDE 1 MG/ML
INJECTION, SOLUTION INTRAMUSCULAR; INTRAVENOUS; SUBCUTANEOUS
Status: DISPENSED
Start: 2019-02-01

## (undated) RX ORDER — DIMENHYDRINATE 50 MG/ML
INJECTION, SOLUTION INTRAMUSCULAR; INTRAVENOUS
Status: DISPENSED
Start: 2019-02-01